# Patient Record
Sex: MALE | Race: WHITE | NOT HISPANIC OR LATINO | Employment: FULL TIME | URBAN - NONMETROPOLITAN AREA
[De-identification: names, ages, dates, MRNs, and addresses within clinical notes are randomized per-mention and may not be internally consistent; named-entity substitution may affect disease eponyms.]

---

## 2018-11-05 ENCOUNTER — APPOINTMENT (OUTPATIENT)
Dept: GENERAL RADIOLOGY | Facility: HOSPITAL | Age: 43
End: 2018-11-05
Attending: EMERGENCY MEDICINE

## 2018-11-05 ENCOUNTER — HOSPITAL ENCOUNTER (EMERGENCY)
Facility: HOSPITAL | Age: 43
Discharge: HOME | End: 2018-11-05
Attending: EMERGENCY MEDICINE | Admitting: EMERGENCY MEDICINE

## 2018-11-05 ENCOUNTER — APPOINTMENT (OUTPATIENT)
Dept: CT IMAGING | Facility: HOSPITAL | Age: 43
End: 2018-11-05
Attending: EMERGENCY MEDICINE

## 2018-11-05 VITALS
OXYGEN SATURATION: 96 % | DIASTOLIC BLOOD PRESSURE: 90 MMHG | TEMPERATURE: 97.7 F | RESPIRATION RATE: 18 BRPM | BODY MASS INDEX: 35.95 KG/M2 | WEIGHT: 265.4 LBS | HEART RATE: 66 BPM | SYSTOLIC BLOOD PRESSURE: 155 MMHG | HEIGHT: 72 IN

## 2018-11-05 DIAGNOSIS — R10.9 ABDOMINAL PAIN, UNSPECIFIED ABDOMINAL LOCATION: Primary | ICD-10-CM

## 2018-11-05 LAB
ALBUMIN SERPL-MCNC: 5 G/DL (ref 3.5–5)
ALBUMIN/GLOB SERPL: 1.7 G/DL (ref 1–2)
ALP SERPL-CCNC: 64 U/L (ref 38–126)
ALT SERPL W P-5'-P-CCNC: 41 U/L (ref 13–69)
ANION GAP SERPL CALCULATED.3IONS-SCNC: 13.4 MMOL/L (ref 10–20)
AST SERPL-CCNC: 25 U/L (ref 15–46)
BASOPHILS # BLD AUTO: 0.06 10*3/MM3 (ref 0–0.2)
BASOPHILS NFR BLD AUTO: 0.6 % (ref 0–2.5)
BILIRUB SERPL-MCNC: 0.5 MG/DL (ref 0.2–1.3)
BUN BLD-MCNC: 15 MG/DL (ref 7–20)
BUN/CREAT SERPL: 15 (ref 6.3–21.9)
CALCIUM SPEC-SCNC: 9.7 MG/DL (ref 8.4–10.2)
CHLORIDE SERPL-SCNC: 104 MMOL/L (ref 98–107)
CO2 SERPL-SCNC: 28 MMOL/L (ref 26–30)
CREAT BLD-MCNC: 1 MG/DL (ref 0.6–1.3)
DEPRECATED RDW RBC AUTO: 38.8 FL (ref 37–54)
EOSINOPHIL # BLD AUTO: 0.01 10*3/MM3 (ref 0–0.7)
EOSINOPHIL NFR BLD AUTO: 0.1 % (ref 0–7)
ERYTHROCYTE [DISTWIDTH] IN BLOOD BY AUTOMATED COUNT: 12.8 % (ref 11.5–14.5)
GFR SERPL CREATININE-BSD FRML MDRD: 82 ML/MIN/1.73
GLOBULIN UR ELPH-MCNC: 3 GM/DL
GLUCOSE BLD-MCNC: 135 MG/DL (ref 74–98)
HCT VFR BLD AUTO: 45.3 % (ref 42–52)
HGB BLD-MCNC: 15.9 G/DL (ref 14–18)
IMM GRANULOCYTES # BLD: 0.05 10*3/MM3 (ref 0–0.06)
IMM GRANULOCYTES NFR BLD: 0.5 % (ref 0–0.6)
LIPASE SERPL-CCNC: 56 U/L (ref 23–300)
LYMPHOCYTES # BLD AUTO: 1.3 10*3/MM3 (ref 0.6–3.4)
LYMPHOCYTES NFR BLD AUTO: 11.9 % (ref 10–50)
MCH RBC QN AUTO: 29.5 PG (ref 27–31)
MCHC RBC AUTO-ENTMCNC: 35.1 G/DL (ref 30–37)
MCV RBC AUTO: 84 FL (ref 80–94)
MONOCYTES # BLD AUTO: 0.28 10*3/MM3 (ref 0–0.9)
MONOCYTES NFR BLD AUTO: 2.6 % (ref 0–12)
NEUTROPHILS # BLD AUTO: 9.19 10*3/MM3 (ref 2–6.9)
NEUTROPHILS NFR BLD AUTO: 84.3 % (ref 37–80)
NRBC BLD MANUAL-RTO: 0 /100 WBC (ref 0–0)
PLATELET # BLD AUTO: 258 10*3/MM3 (ref 130–400)
PMV BLD AUTO: 10.3 FL (ref 6–12)
POTASSIUM BLD-SCNC: 4.4 MMOL/L (ref 3.5–5.1)
PROT SERPL-MCNC: 8 G/DL (ref 6.3–8.2)
RBC # BLD AUTO: 5.39 10*6/MM3 (ref 4.7–6.1)
SODIUM BLD-SCNC: 141 MMOL/L (ref 137–145)
TROPONIN I SERPL-MCNC: <0.012 NG/ML (ref 0–0.03)
TROPONIN I SERPL-MCNC: <0.012 NG/ML (ref 0–0.03)
WBC NRBC COR # BLD: 10.89 10*3/MM3 (ref 4.8–10.8)

## 2018-11-05 PROCEDURE — 96376 TX/PRO/DX INJ SAME DRUG ADON: CPT

## 2018-11-05 PROCEDURE — 71046 X-RAY EXAM CHEST 2 VIEWS: CPT

## 2018-11-05 PROCEDURE — 96374 THER/PROPH/DIAG INJ IV PUSH: CPT

## 2018-11-05 PROCEDURE — 99284 EMERGENCY DEPT VISIT MOD MDM: CPT

## 2018-11-05 PROCEDURE — 80053 COMPREHEN METABOLIC PANEL: CPT | Performed by: EMERGENCY MEDICINE

## 2018-11-05 PROCEDURE — 74177 CT ABD & PELVIS W/CONTRAST: CPT

## 2018-11-05 PROCEDURE — 96361 HYDRATE IV INFUSION ADD-ON: CPT

## 2018-11-05 PROCEDURE — 93005 ELECTROCARDIOGRAM TRACING: CPT | Performed by: EMERGENCY MEDICINE

## 2018-11-05 PROCEDURE — 96375 TX/PRO/DX INJ NEW DRUG ADDON: CPT

## 2018-11-05 PROCEDURE — 25010000002 MORPHINE PER 10 MG: Performed by: EMERGENCY MEDICINE

## 2018-11-05 PROCEDURE — 25010000002 IOPAMIDOL 61 % SOLUTION: Performed by: EMERGENCY MEDICINE

## 2018-11-05 PROCEDURE — 83690 ASSAY OF LIPASE: CPT | Performed by: EMERGENCY MEDICINE

## 2018-11-05 PROCEDURE — 84484 ASSAY OF TROPONIN QUANT: CPT | Performed by: EMERGENCY MEDICINE

## 2018-11-05 PROCEDURE — 85025 COMPLETE CBC W/AUTO DIFF WBC: CPT | Performed by: EMERGENCY MEDICINE

## 2018-11-05 PROCEDURE — 25010000002 ONDANSETRON PER 1 MG: Performed by: EMERGENCY MEDICINE

## 2018-11-05 RX ORDER — SUCRALFATE 1 G/1
1 TABLET ORAL ONCE
Status: COMPLETED | OUTPATIENT
Start: 2018-11-05 | End: 2018-11-05

## 2018-11-05 RX ORDER — FAMOTIDINE 10 MG/ML
20 INJECTION, SOLUTION INTRAVENOUS ONCE
Status: COMPLETED | OUTPATIENT
Start: 2018-11-05 | End: 2018-11-05

## 2018-11-05 RX ORDER — MORPHINE SULFATE 4 MG/ML
4 INJECTION, SOLUTION INTRAMUSCULAR; INTRAVENOUS ONCE
Status: COMPLETED | OUTPATIENT
Start: 2018-11-05 | End: 2018-11-05

## 2018-11-05 RX ORDER — ONDANSETRON 2 MG/ML
4 INJECTION INTRAMUSCULAR; INTRAVENOUS ONCE
Status: COMPLETED | OUTPATIENT
Start: 2018-11-05 | End: 2018-11-05

## 2018-11-05 RX ORDER — ONDANSETRON 4 MG/1
4 TABLET, ORALLY DISINTEGRATING ORAL EVERY 8 HOURS PRN
Qty: 12 TABLET | Refills: 0 | Status: SHIPPED | OUTPATIENT
Start: 2018-11-05 | End: 2018-11-27

## 2018-11-05 RX ORDER — ALUMINA, MAGNESIA, AND SIMETHICONE 2400; 2400; 240 MG/30ML; MG/30ML; MG/30ML
15 SUSPENSION ORAL ONCE
Status: COMPLETED | OUTPATIENT
Start: 2018-11-05 | End: 2018-11-05

## 2018-11-05 RX ORDER — SUCRALFATE 1 G/1
1 TABLET ORAL 4 TIMES DAILY
Qty: 40 TABLET | Refills: 0 | Status: SHIPPED | OUTPATIENT
Start: 2018-11-05 | End: 2018-11-27

## 2018-11-05 RX ORDER — PANTOPRAZOLE SODIUM 20 MG/1
20 TABLET, DELAYED RELEASE ORAL 2 TIMES DAILY
Qty: 60 TABLET | Refills: 0 | Status: SHIPPED | OUTPATIENT
Start: 2018-11-05 | End: 2018-12-19 | Stop reason: ALTCHOICE

## 2018-11-05 RX ADMIN — MORPHINE SULFATE 4 MG: 4 INJECTION, SOLUTION INTRAMUSCULAR; INTRAVENOUS at 08:43

## 2018-11-05 RX ADMIN — SODIUM CHLORIDE 1000 ML: 9 INJECTION, SOLUTION INTRAVENOUS at 08:04

## 2018-11-05 RX ADMIN — LIDOCAINE HYDROCHLORIDE 15 ML: 20 SOLUTION ORAL; TOPICAL at 08:00

## 2018-11-05 RX ADMIN — SUCRALFATE 1 G: 1 TABLET ORAL at 09:43

## 2018-11-05 RX ADMIN — ONDANSETRON 4 MG: 2 INJECTION INTRAMUSCULAR; INTRAVENOUS at 08:02

## 2018-11-05 RX ADMIN — FAMOTIDINE 20 MG: 10 INJECTION, SOLUTION INTRAVENOUS at 08:03

## 2018-11-05 RX ADMIN — MORPHINE SULFATE 4 MG: 4 INJECTION, SOLUTION INTRAMUSCULAR; INTRAVENOUS at 09:43

## 2018-11-05 RX ADMIN — ALUMINUM HYDROXIDE, MAGNESIUM HYDROXIDE, AND DIMETHICONE 15 ML: 400; 400; 40 SUSPENSION ORAL at 08:00

## 2018-11-05 RX ADMIN — IOPAMIDOL 100 ML: 612 INJECTION, SOLUTION INTRAVENOUS at 08:12

## 2018-11-05 NOTE — ED PROVIDER NOTES
TRIAGE CHIEF COMPLAINT:     Nursing and triage notes reviewed    Chief Complaint   Patient presents with   • Abdominal Pain      HPI: Alek Ray is a 43 y.o. male who presents to the emergency department complaining of epigastric abdominal pain associated with nausea and vomiting.  Patient states symptoms began around 3 this morning.  Patient states he has been sweaty and has had several episodes of nonbilious emesis.  Patient states that he has had episodes like this intermittently over the past year and is seen his primary care physician for this.  He states he was on some medications for reflux or an ulcer for a few weeks which she states did help in the short-term but now is getting symptoms again.  He denies diarrhea.  Denies fevers or chills.  Denies shortness of breath.     REVIEW OF SYSTEMS: All other systems reviewed and are negative     PAST MEDICAL HISTORY:   History reviewed. No pertinent past medical history.     FAMILY HISTORY:   History reviewed. No pertinent family history.     SOCIAL HISTORY:   Social History     Social History   • Marital status:      Spouse name: N/A   • Number of children: N/A   • Years of education: N/A     Occupational History   • Not on file.     Social History Main Topics   • Smoking status: Never Smoker   • Smokeless tobacco: Never Used   • Alcohol use Yes      Comment: 2-3x weekly   • Drug use: No   • Sexual activity: Not on file     Other Topics Concern   • Not on file     Social History Narrative   • No narrative on file        SURGICAL HISTORY:   History reviewed. No pertinent surgical history.     CURRENT MEDICATIONS:      Medication List      You have not been prescribed any medications.        ALLERGIES: Patient has no known allergies.     PHYSICAL EXAM:   VITAL SIGNS:   Vitals:    11/05/18 0716   BP: (!) 182/109   Pulse: 84   Resp: 18   Temp: 97.7 °F (36.5 °C)   SpO2: 97%      CONSTITUTIONAL: Awake, oriented, appears non-toxic but  uncomfortable  HENT: Atraumatic, normocephalic, oral mucosa pink and moist, airway patent.  EYES: Conjunctiva clear   NECK: Trachea midline  CARDIOVASCULAR: Normal heart rate, Normal rhythm, No murmurs, rubs, gallops   PULMONARY/CHEST: Clear to auscultation, no rhonchi, wheezes, or rales. Symmetrical breath sounds.  ABDOMINAL: Non-distended, soft, minimal epigastric tenderness - no rebound or guarding.   NEUROLOGIC: Non-focal, moving all four extremities, no gross sensory or motor deficits.   EXTREMITIES: No clubbing, cyanosis, or edema   SKIN: Warm, Dry, No erythema, No rash     ED COURSE / MEDICAL DECISION MAKING:   Alek Ray is a 43 y.o. male who presents to the emergency department for evaluation of epigastric abdominal pain associated with nausea and vomiting.  Patient is hypertensive on arrival in the emergency department appears mildly uncomfortable.  Physical exam reveals some very minimal tenderness in epigastric abdomen.  The exam is otherwise largely unremarkable.  We'll obtain labs and imaging for further evaluation of patient's symptoms.  EKG was obtained on arrival which I interpreted reveals sinus rhythm with rate of 72 bpm.  There are no acute ST segment or T-wave changes.  No acute signs of arrhythmia or ischemia.  Normal-appearing EKG.  Patient's blood work is largely unremarkable.  A chest x-ray does not reveal any acute abnormalities.  CT scan of the abdomen and pelvis does not reveal any acute intra-abdominal process.  Patient had some improvement with symptomatic therapies in the emergency department.  We will discharge with a GI outpatient referral for further evaluation.    DECISION TO DISCHARGE/ADMIT: see ED care timeline     FINAL IMPRESSION:   1 -- abdominal pain   2 --   3 --     Electronically signed by: Odalys Lorenz MD, 11/5/2018 7:41 AM       Odalys Lorenz MD  11/05/18 1044

## 2018-11-27 ENCOUNTER — PREP FOR SURGERY (OUTPATIENT)
Dept: OTHER | Facility: HOSPITAL | Age: 43
End: 2018-11-27

## 2018-11-27 ENCOUNTER — OFFICE VISIT (OUTPATIENT)
Dept: GASTROENTEROLOGY | Facility: CLINIC | Age: 43
End: 2018-11-27

## 2018-11-27 VITALS
WEIGHT: 261 LBS | HEIGHT: 72 IN | SYSTOLIC BLOOD PRESSURE: 126 MMHG | TEMPERATURE: 97.6 F | BODY MASS INDEX: 35.35 KG/M2 | DIASTOLIC BLOOD PRESSURE: 91 MMHG | HEART RATE: 93 BPM | RESPIRATION RATE: 15 BRPM

## 2018-11-27 DIAGNOSIS — R10.13 EPIGASTRIC PAIN: Primary | ICD-10-CM

## 2018-11-27 DIAGNOSIS — R11.2 NAUSEA AND VOMITING, INTRACTABILITY OF VOMITING NOT SPECIFIED, UNSPECIFIED VOMITING TYPE: Chronic | ICD-10-CM

## 2018-11-27 DIAGNOSIS — R10.13 EPIGASTRIC PAIN: Primary | Chronic | ICD-10-CM

## 2018-11-27 DIAGNOSIS — R19.7 DIARRHEA, UNSPECIFIED TYPE: Chronic | ICD-10-CM

## 2018-11-27 DIAGNOSIS — R11.2 NAUSEA AND VOMITING, INTRACTABILITY OF VOMITING NOT SPECIFIED, UNSPECIFIED VOMITING TYPE: ICD-10-CM

## 2018-11-27 PROCEDURE — 99204 OFFICE O/P NEW MOD 45 MIN: CPT | Performed by: NURSE PRACTITIONER

## 2018-11-27 RX ORDER — SODIUM CHLORIDE 9 MG/ML
70 INJECTION, SOLUTION INTRAVENOUS CONTINUOUS PRN
Status: CANCELLED | OUTPATIENT
Start: 2018-11-27

## 2018-11-27 NOTE — PATIENT INSTRUCTIONS
1. Antireflux measures: Avoid fried, fatty foods, alcohol, chocolate, coffee, tea,  soft drinks, peppermint and spearmint, spicy foods, tomatoes and tomato based foods, onion based foods, and smoking. Other antireflux measures include weight reduction if overweight, avoiding tight clothing around the abdomen, elevating the head of the bed 6 inches with blocks under the head board, and don't drink or eat before going to bed and avoid lying down immediately after meals.  2. Omeprazole 20 mg 1 po daily in the am and 1 po in the pm.  3. Zofran 4 mg 1 po every 8 hours as needed for nausea.  4. Upper endoscopy-EGD: Description of the procedure, risks, benefits, alternatives and options, including nonoperative options, were discussed with the patient in detail. The patient understands and wishes to proceed.

## 2018-11-27 NOTE — PROGRESS NOTES
"Chief Complaint   Patient presents with   • Abdominal Pain   • Vomiting   • Nausea   • Diarrhea     There is a long-standing history of epigastric pain that started about 18 months ago. The pain has gradually worsened. The pain occurs \"randomly\". The pain is severe and described as sharp. Eating can make the pain worse. The patient has been taking Pantoprazole, but it does not help the pain. The patient denies taking NSAIDs. The patient denies heartburn. There is no history of difficulty swallowing.    There is a long-standing history of nausea. The nausea occurs when he is having the epigastric pain. Eating makes the nausea worse. He has taken Zofran as needed but he has not noticed any improvement with nausea. He has had episodes of vomiting. He has vomited dark green bile at times, but no bright red blood. There is no history of melena.     There is a long-standing history of diarrhea. The patient may have diarrhea once per month on average with 2-3 episodes of diarrhea per day. Stools are described as loose. He is not taking anything for diarrhea. There is no history of bright red blood per rectum. The patient denies constipation.    The patient's last colonoscopy was over 10 years ago. There is a family history of colon cancer in the patient's grandfather in his 80's.    Abdominal Pain   This is a chronic problem. Episode onset: 18 months ago. The onset quality is sudden. The problem occurs intermittently. The problem has been gradually worsening. The pain is located in the epigastric region. The pain is severe. The quality of the pain is sharp. The abdominal pain does not radiate. Associated symptoms include diarrhea, nausea and vomiting. Pertinent negatives include no arthralgias, constipation, dysuria, fever, headaches, hematuria or myalgias. The pain is aggravated by eating. The pain is relieved by nothing. He has tried proton pump inhibitors for the symptoms. The treatment provided no relief. Prior " diagnostic workup includes CT scan.   Nausea   This is a chronic problem. Episode onset: 18 months ago. The problem occurs intermittently. The problem has been unchanged. Associated symptoms include abdominal pain, nausea and vomiting. Pertinent negatives include no arthralgias, chest pain, chills, coughing, fatigue, fever, headaches, joint swelling, myalgias or rash. The symptoms are aggravated by eating. He has tried nothing for the symptoms.   Vomiting    This is a chronic problem. Episode onset: 18 months ago. Episode frequency: intermittently. The problem has been unchanged. The emesis has an appearance of bile. There has been no fever. Associated symptoms include abdominal pain and diarrhea. Pertinent negatives include no arthralgias, chest pain, chills, coughing, dizziness, fever, headaches or myalgias. He has tried nothing for the symptoms.   Diarrhea    This is a chronic problem. Episode onset: over 10 years. Episode frequency: once per month with 2-3 episodes per day. The problem has been unchanged. Diarrhea characteristics: loose. The patient states that diarrhea does not awaken him from sleep. Associated symptoms include abdominal pain and vomiting. Pertinent negatives include no arthralgias, chills, coughing, fever, headaches or myalgias. Nothing aggravates the symptoms. There are no known risk factors. He has tried nothing for the symptoms.     Review of Systems   Constitutional: Negative for appetite change, chills, fatigue, fever and unexpected weight change.   HENT: Negative for mouth sores, nosebleeds and trouble swallowing.    Eyes: Negative for discharge and redness.   Respiratory: Negative for apnea, cough and shortness of breath.    Cardiovascular: Negative for chest pain, palpitations and leg swelling.   Gastrointestinal: Positive for abdominal pain, diarrhea, nausea and vomiting. Negative for abdominal distention, anal bleeding, blood in stool and constipation.   Endocrine: Negative for cold  "intolerance, heat intolerance and polydipsia.   Genitourinary: Negative for dysuria, hematuria and urgency.   Musculoskeletal: Negative for arthralgias, joint swelling and myalgias.   Skin: Negative for rash.   Allergic/Immunologic: Negative for food allergies and immunocompromised state.   Neurological: Negative for dizziness, seizures, syncope and headaches.   Hematological: Negative for adenopathy. Does not bruise/bleed easily.   Psychiatric/Behavioral: Negative for dysphoric mood. The patient is not nervous/anxious and is not hyperactive.      Patient Active Problem List   Diagnosis   • Epigastric pain   • Nausea and vomiting     History reviewed. No pertinent past medical history.     Past Surgical History:   Procedure Laterality Date   • COLONOSCOPY  over 10 years ago      Family History   Problem Relation Age of Onset   • Colon cancer Maternal Grandfather 80     Social History     Tobacco Use   • Smoking status: Never Smoker   • Smokeless tobacco: Never Used   Substance Use Topics   • Alcohol use: Yes     Comment: 2-3x weekly       Current Outpatient Medications:   •  pantoprazole (PROTONIX) 20 MG EC tablet, Take 1 tablet by mouth 2 (Two) Times a Day., Disp: 60 tablet, Rfl: 0    No Known Allergies    /91   Pulse 93   Temp 97.6 °F (36.4 °C)   Resp 15   Ht 182.9 cm (72\")   Wt 118 kg (261 lb)   BMI 35.40 kg/m²     Physical Exam   Constitutional: He is oriented to person, place, and time. He appears well-developed and well-nourished. No distress.   HENT:   Head: Normocephalic and atraumatic.   Right Ear: Hearing and external ear normal.   Left Ear: Hearing and external ear normal.   Nose: Nose normal.   Mouth/Throat: Oropharynx is clear and moist and mucous membranes are normal. Mucous membranes are not pale, not dry and not cyanotic. No oral lesions. No oropharyngeal exudate.   Eyes: Conjunctivae and EOM are normal. Right eye exhibits no discharge. Left eye exhibits no discharge.   Neck: Trachea " normal. Neck supple. No JVD present. No edema present. No thyroid mass and no thyromegaly present.   Cardiovascular: Normal rate, regular rhythm, S2 normal and normal heart sounds. Exam reveals no gallop, no S3 and no friction rub.   No murmur heard.  Pulmonary/Chest: Effort normal and breath sounds normal. No respiratory distress. He exhibits no tenderness.   Abdominal: Normal appearance and bowel sounds are normal. He exhibits no distension, no ascites and no mass. There is no splenomegaly or hepatomegaly. There is no tenderness. There is no rigidity, no rebound and no guarding. No hernia.     Vascular Status -  His right foot exhibits no edema. His left foot exhibits no edema.  Lymphadenopathy:     He has no cervical adenopathy.        Left: No supraclavicular adenopathy present.   Neurological: He is alert and oriented to person, place, and time. He has normal strength. No cranial nerve deficit or sensory deficit.   Skin: No rash noted. He is not diaphoretic. No cyanosis. No pallor. Nails show no clubbing.   Psychiatric: He has a normal mood and affect.   Nursing note and vitals reviewed.  Stigmata of chronic liver disease:  None.  Asterixis:  None.    Laboratory Tests:   Upon review of records:     Dated 11/5/2018 glucose 135 BUN 15 creatinine 1.0 sodium 141 potassium 4.4 chloride 104 CO2 28 calcium 9.7 albumin 5.0 ALT 41 AST 25 alkaline phosphatase 64 total bilirubin 0.5 WBC 10.89 hemoglobin 15.9 hematocrit 45.3 platelet count 258 MCV 84.0 lipase 56 troponin <0.012    Abdominal Imaging:  Upon review of records:     CT of abdomen and pelvis with contrast dated 11/5/2018 reveals lung bases are clear. The heart is normal in size. The liver is normal. The spleen is unremarkable. No adrenal mass is present.  The pancreas is normal. The kidneys are normal. The aorta is normal in caliber. There is no free fluid or adenopathy. The abdominal portions of the GI tract are unremarkable with no evidence of obstruction. The  appendix is normal.  The urinary bladder is unremarkable. There is no significant free fluid or adenopathy.       Assessment:      ICD-10-CM ICD-9-CM   1. Epigastric pain R10.13 789.06   2. Nausea and vomiting, intractability of vomiting not specified, unspecified vomiting type R11.2 787.01   3. Diarrhea, unspecified type R19.7 787.91     Plan/  Patient Instructions   1. Antireflux measures: Avoid fried, fatty foods, alcohol, chocolate, coffee, tea,  soft drinks, peppermint and spearmint, spicy foods, tomatoes and tomato based foods, onion based foods, and smoking. Other antireflux measures include weight reduction if overweight, avoiding tight clothing around the abdomen, elevating the head of the bed 6 inches with blocks under the head board, and don't drink or eat before going to bed and avoid lying down immediately after meals.  2. Omeprazole 20 mg 1 po daily in the am and 1 po in the pm.  3. Zofran 4 mg 1 po every 8 hours as needed for nausea.  4. Upper endoscopy-EGD: Description of the procedure, risks, benefits, alternatives and options, including nonoperative options, were discussed with the patient in detail. The patient understands and wishes to proceed.     FLAVIA Christianson

## 2018-12-12 ENCOUNTER — HOSPITAL ENCOUNTER (OUTPATIENT)
Facility: HOSPITAL | Age: 43
Setting detail: HOSPITAL OUTPATIENT SURGERY
Discharge: HOME | End: 2018-12-12
Attending: INTERNAL MEDICINE | Admitting: INTERNAL MEDICINE

## 2018-12-12 ENCOUNTER — ANESTHESIA EVENT (OUTPATIENT)
Dept: GASTROENTEROLOGY | Facility: HOSPITAL | Age: 43
End: 2018-12-12

## 2018-12-12 ENCOUNTER — ANESTHESIA (OUTPATIENT)
Dept: GASTROENTEROLOGY | Facility: HOSPITAL | Age: 43
End: 2018-12-12

## 2018-12-12 ENCOUNTER — TELEPHONE (OUTPATIENT)
Dept: GASTROENTEROLOGY | Facility: CLINIC | Age: 43
End: 2018-12-12

## 2018-12-12 VITALS
HEIGHT: 72 IN | TEMPERATURE: 97.6 F | OXYGEN SATURATION: 92 % | SYSTOLIC BLOOD PRESSURE: 132 MMHG | HEART RATE: 77 BPM | DIASTOLIC BLOOD PRESSURE: 84 MMHG | WEIGHT: 261 LBS | RESPIRATION RATE: 20 BRPM | BODY MASS INDEX: 35.35 KG/M2

## 2018-12-12 DIAGNOSIS — R10.13 EPIGASTRIC PAIN: ICD-10-CM

## 2018-12-12 DIAGNOSIS — R11.2 NAUSEA AND VOMITING, INTRACTABILITY OF VOMITING NOT SPECIFIED, UNSPECIFIED VOMITING TYPE: ICD-10-CM

## 2018-12-12 PROCEDURE — 43239 EGD BIOPSY SINGLE/MULTIPLE: CPT | Performed by: INTERNAL MEDICINE

## 2018-12-12 PROCEDURE — 25010000002 PROPOFOL 200 MG/20ML EMULSION: Performed by: NURSE ANESTHETIST, CERTIFIED REGISTERED

## 2018-12-12 PROCEDURE — 25010000002 ONDANSETRON PER 1 MG: Performed by: NURSE ANESTHETIST, CERTIFIED REGISTERED

## 2018-12-12 RX ORDER — SODIUM CHLORIDE 9 MG/ML
70 INJECTION, SOLUTION INTRAVENOUS CONTINUOUS PRN
Status: DISCONTINUED | OUTPATIENT
Start: 2018-12-12 | End: 2018-12-12 | Stop reason: HOSPADM

## 2018-12-12 RX ORDER — PROPOFOL 10 MG/ML
INJECTION, EMULSION INTRAVENOUS AS NEEDED
Status: DISCONTINUED | OUTPATIENT
Start: 2018-12-12 | End: 2018-12-12 | Stop reason: SURG

## 2018-12-12 RX ORDER — ONDANSETRON 2 MG/ML
INJECTION INTRAMUSCULAR; INTRAVENOUS AS NEEDED
Status: DISCONTINUED | OUTPATIENT
Start: 2018-12-12 | End: 2018-12-12 | Stop reason: SURG

## 2018-12-12 RX ORDER — SODIUM CHLORIDE 0.9 % (FLUSH) 0.9 %
3 SYRINGE (ML) INJECTION AS NEEDED
Status: DISCONTINUED | OUTPATIENT
Start: 2018-12-12 | End: 2018-12-12 | Stop reason: HOSPADM

## 2018-12-12 RX ADMIN — LIDOCAINE HYDROCHLORIDE 100 MG: 20 INJECTION, SOLUTION INTRAVENOUS at 07:17

## 2018-12-12 RX ADMIN — PROPOFOL 100 MG: 10 INJECTION, EMULSION INTRAVENOUS at 07:17

## 2018-12-12 RX ADMIN — PROPOFOL 100 MG: 10 INJECTION, EMULSION INTRAVENOUS at 07:24

## 2018-12-12 RX ADMIN — ONDANSETRON 4 MG: 2 INJECTION INTRAMUSCULAR; INTRAVENOUS at 07:24

## 2018-12-12 RX ADMIN — PROPOFOL 50 MG: 10 INJECTION, EMULSION INTRAVENOUS at 07:28

## 2018-12-12 RX ADMIN — SODIUM CHLORIDE 70 ML/HR: 9 INJECTION, SOLUTION INTRAVENOUS at 07:03

## 2018-12-12 NOTE — OP NOTE
PROCEDURE:  Upper Endoscopy with biopsies.    DATE OF PROCEDURE: December 12, 2018.    REFERRING PROVIDER:  Enrique Loving MD.     INSTRUMENT: Olympus GIF H 190 video endoscope     INDICATIONS OF THE PROCEDURE: This is a 43-year-old white male with history of recurrent nausea vomiting and epigastric abdominal pain. Currently undergoing upper endoscopy for further evaluation.       BIOPSIES: Second portion of duodenum.  Gastric antrum, body and fundus.  Biopsies were obtained from the distal esophagus-short segment Alvarez's appearing mucosa.     MEDICATIONS:  MAC.     PHOTOGRAPHS:  Photographs were included in the medical records.     CONSENT/PREPROCEDURE EVALUATION:  Risks, benefits, alternatives and options of the procedure including risks of anesthesia/sedation were discussed and informed consent was obtained prior to the procedure. History and physical examination were performed and nothing precluded the test.     REPORT:  The patient was placed in left lateral decubitus position. Once under the influence of IV sedation, the instrument was inserted into the mouth and esophagus was intubated under direct vision without difficulty.     Esophagus:  Z line was noted to be around 41  cm.  mild erythematous distal esophagitis was seen.  A small sliding hiatal hernia less than 3 cm was noted.  1 cm tongue of gastric type mucosa was noted in the distal esophagus raising the possibility of underlying short segment Alvarez's. This was biopsied.      Stomach:  Antrum:  Erythematous gastritis.  Evidence of healed ulceration was noted. Angulus, lesser and greater curves: Normal.  Retroflex examination: Sliding hiatal hernia.  Cardia and fundus:  Normal.     Body of the stomach: Erythematous gastritis.  Good distensibility of the stomach was achieved no giant folds were noted.   Biopsies were obtained from the gastric antrum,  body and fundus.    Pylorus and pyloric channel:  normal.     Duodenum:  Bulb: Normal.  Second  portion: normal.  No scalloping was seen in the second portion of duodenum.  Biopsies were obtained from the second portion of duodenum. Major ampulla was in part visualized and appeared to be somewhat prominent.    Intervention:  None.       The upper GI tract was decompressed and the scope was pulled out of the patient. The patient tolerated the procedure well.     DIAGNOSES:     1. Erythematous distal esophagitis.  2. Small sliding hiatal hernia less than 3 cm.  3. Erythematous gastritis with an occasional erosion at the gastric antrum.  4. Evidence of old healed ulceration.  5. Major ampulla was in part visualized. Somewhat prominent but still within normal limits.  6. Good distensibility of the stomach was achieved. No giant folds were noted.    RECOMMENDATIONS:  1.  Dietary instructions.  2.  Pantoprazole 40 mg 1 p.o. q.a.m. 1/2 hour before breakfast.  3.  Follow biopsies.  4.  Follow up in office.  5.  Zofran 4 mg tablet.  1-to 3 times a day by mouth as needed for nausea.         Thank you very much for letting me participate in the care of this patient. Please do not hesitate to call me if you have any questions.

## 2018-12-12 NOTE — ANESTHESIA PREPROCEDURE EVALUATION
Anesthesia Evaluation     Patient summary reviewed and Nursing notes reviewed   NPO Solid Status: > 8 hours  NPO Liquid Status: > 8 hours           Airway   Mallampati: I  TM distance: >3 FB  Neck ROM: full  No difficulty expected  Dental - normal exam     Pulmonary - normal exam   (+) sleep apnea ( snoring),   Cardiovascular - negative cardio ROS and normal exam  Exercise tolerance: excellent (>7 METS)        Neuro/Psych- negative ROS  GI/Hepatic/Renal/Endo - negative ROS     Musculoskeletal (-) negative ROS    Abdominal  - normal exam    Bowel sounds: normal.   Substance History - negative use     OB/GYN negative ob/gyn ROS         Other                        Anesthesia Plan    ASA 1     MAC     intravenous induction   Anesthetic plan, all risks, benefits, and alternatives have been provided, discussed and informed consent has been obtained with: patient.    Plan discussed with attending.

## 2018-12-12 NOTE — DISCHARGE INSTRUCTIONS
No pushing, pulling, tugging, heavy lifting, or strenuous activity.  No major decision making, driving, or drinking alcoholic beverages for 24 hours. (due to the medications you have received)  Always use good hand hygiene/washing techniques.  NO driving while taking pain medications.    To assist you in voiding:  Drink plenty of fluids  Listen to running water while attempting to void.    If you are unable to urinate and you have an uncomfortable urge to void or it has been   6 hours since you were discharged, return to the Emergency Room    ************************************************************************************      Postprocedure instructions.  1. Nothing by mouth for 30 min.  2. Clear liquids diet (No Sodas) for 1 hours.  3. May advance to soft low-fat diet  in 2 hours       Diet otherwise:    1. Low fat diet.    2. Avoid soda beverages, excessive use of coffee and tea.   3. Avoid smoking and alcohol.      Other Instructions:  Call [facility] at [phone] or come to the Emergency Department if you experience the following: Chest pain, abdominal pain, bleeding (vomiting of blood or coffee colored material, black stools or gigi blood in stools),   fever/chills, nausea and vomiting or dizziness.    Follow-up:    Dr. Hill in 3-4 weeks.   Office phone #[phone].   If you already have an appointment just keep that appointment.    ************************************************************************************    Notes to the patient and the family from Dr. Hill.    Dear patient/family member,    Findings on today's procedure are as follows:  1. Esophagitis. Inflammation of the esophagus.  2. Inflammation of the stomach. Gastritis.    3. Small sliding hiatal hernia.    4. No cancer. No active ulcers.    Recommendations:    1. Protonix (Pantoprazole) tablet 40 mg tablet. Take 1 tablet orally in the morning half an hour before eating every day.  2. Zofran 4 mg tablet.  1-to 3  times a day by mouth as needed for nausea.       Should you have more questions please do not hesitate to talk to the nurse who can call me and let me talk to you.      I hope you feel better.    Guido Hill M.D., FACP, FACG.

## 2018-12-12 NOTE — TELEPHONE ENCOUNTER
----- Message from Rena Vázquez sent at 12/12/2018  1:49 PM EST -----  PT WIFE CALLED AND LM WITH SEVERAL CLINICAL QUESTIONS, WOULD LIKE A CALL BACK

## 2018-12-12 NOTE — ANESTHESIA POSTPROCEDURE EVALUATION
Patient: Alek Ray    Procedure Summary     Date:  12/12/18 Room / Location:  [facility] ENDOSCOPY 2 / [facility] ENDOSCOPY    Anesthesia Start:  0710 Anesthesia Stop:  0731    Procedure:  ESOPHAGOGASTRODUODENOSCOPY WITH BIOPSIES (N/A Esophagus) Diagnosis:       Epigastric pain      Nausea and vomiting, intractability of vomiting not specified, unspecified vomiting type      (Epigastric pain [R10.13])      (Nausea and vomiting, intractability of vomiting not specified, unspecified vomiting type [R11.2])    Surgeon:  Guido Hill MD Provider:  Juan Miguel Durand CRNA    Anesthesia Type:  MAC ASA Status:  1          Anesthesia Type: MAC  Last vitals  BP   112/72 (12/12/18 0740)   Temp   97.6 °F (36.4 °C) (12/12/18 0740)   Pulse   91 (12/12/18 0740)   Resp   18 (12/12/18 0740)     SpO2   94 % (12/12/18 0740)     Post Anesthesia Care and Evaluation    Patient location during evaluation: PHASE II  Patient participation: complete - patient participated  Level of consciousness: awake and alert  Pain score: 0  Pain management: satisfactory to patient  Airway patency: patent  Anesthetic complications: No anesthetic complications  PONV Status: none  Cardiovascular status: acceptable and hemodynamically stable  Respiratory status: acceptable  Hydration status: acceptable

## 2018-12-13 ENCOUNTER — TELEPHONE (OUTPATIENT)
Dept: GASTROENTEROLOGY | Facility: CLINIC | Age: 43
End: 2018-12-13

## 2018-12-13 NOTE — TELEPHONE ENCOUNTER
Called wifes cell number as she had called this am.  Message left that biopsy results would be back in 5-7 days and if Dr. Hill thought that celiac disease testing was indicated, then the biopsy would have included that.

## 2018-12-18 LAB
LAB AP CASE REPORT: NORMAL
PATH REPORT.FINAL DX SPEC: NORMAL

## 2018-12-19 ENCOUNTER — TELEPHONE (OUTPATIENT)
Dept: GASTROENTEROLOGY | Facility: CLINIC | Age: 43
End: 2018-12-19

## 2018-12-19 DIAGNOSIS — R12 HEARTBURN: Primary | ICD-10-CM

## 2018-12-19 RX ORDER — PANTOPRAZOLE SODIUM 40 MG/1
TABLET, DELAYED RELEASE ORAL
Qty: 30 TABLET | Refills: 5 | Status: SHIPPED | OUTPATIENT
Start: 2018-12-19 | End: 2019-08-22 | Stop reason: SDUPTHER

## 2018-12-26 ENCOUNTER — TELEPHONE (OUTPATIENT)
Dept: GASTROENTEROLOGY | Facility: CLINIC | Age: 43
End: 2018-12-26

## 2018-12-26 NOTE — TELEPHONE ENCOUNTER
PT CALLING FOR EGD PATH RESULTS, TOLD HIM INFLAMMATION TAKE PROTONIX, BUT WILL HAVE BERNABE CALL WITH REST. HE SAYS TO LEAVE MESSAGE ON HIS PHONE OR WITH WIFE

## 2018-12-28 NOTE — TELEPHONE ENCOUNTER
Left message on voicemail with results. Advised to call back if further questions. He has appointment for follow up to discuss further.

## 2019-01-23 ENCOUNTER — OFFICE VISIT (OUTPATIENT)
Dept: GASTROENTEROLOGY | Facility: CLINIC | Age: 44
End: 2019-01-23

## 2019-01-23 VITALS
TEMPERATURE: 97.7 F | HEIGHT: 72 IN | WEIGHT: 270 LBS | RESPIRATION RATE: 18 BRPM | SYSTOLIC BLOOD PRESSURE: 144 MMHG | DIASTOLIC BLOOD PRESSURE: 91 MMHG | BODY MASS INDEX: 36.57 KG/M2 | HEART RATE: 94 BPM

## 2019-01-23 DIAGNOSIS — R11.2 NAUSEA AND VOMITING, INTRACTABILITY OF VOMITING NOT SPECIFIED, UNSPECIFIED VOMITING TYPE: ICD-10-CM

## 2019-01-23 DIAGNOSIS — R10.13 EPIGASTRIC ABDOMINAL PAIN: Primary | ICD-10-CM

## 2019-01-23 PROCEDURE — 99215 OFFICE O/P EST HI 40 MIN: CPT | Performed by: INTERNAL MEDICINE

## 2019-01-23 NOTE — PROGRESS NOTES
Chief Complaint   Patient presents with   • Abdominal Pain     History of Present Illness     The patient has history of recurrent epigastric abdominal pain for the last 2 years. Pain is described as sharp and moderate to severe in intensity. The pain occurs intermittently perhaps once in 2-3 months. The pain may last for 24-36 hours and occasionally up to couple of days. There is history of associated nausea and at times emesis. The patient denies definite aggravating or relieving factors of abdominal pain. The patient has some worsening of symptoms in November 2018. He had one episode over the last 1 month. The patient breaks into sweat during an attack. The patient has taken Zofran without significant improvement of the nausea and vomiting. There is no history of abdominal distention.    Currently, the patient has been having one formed stool per day. There is history of some loose diarrheal stools in the past. The patient denies recent weight loss.There is no history of overt GI bleed (Hematemesis, melena or hematochezia). The patient denies anemia. He denies reflux. There is no dysphagia or odynophagia. There is no history of liver or pancreatic disease. The patient's grandfather had colon cancer. Otherwise there is no family history of inflammatory bowel disease or chronic liver disease.     Review of Systems   Constitutional: Negative for appetite change, chills, fatigue, fever and unexpected weight change.   HENT: Negative for mouth sores, nosebleeds and trouble swallowing.    Eyes: Negative for discharge and redness.   Respiratory: Negative for apnea, cough and shortness of breath.    Cardiovascular: Negative for chest pain, palpitations and leg swelling.   Gastrointestinal: Positive for abdominal pain, nausea and vomiting. Negative for abdominal distention, anal bleeding, blood in stool, constipation and diarrhea.   Endocrine: Negative for cold intolerance, heat intolerance and polydipsia.   Genitourinary:  "Negative for dysuria, hematuria and urgency.   Musculoskeletal: Negative for arthralgias, joint swelling and myalgias.   Skin: Negative for rash.   Allergic/Immunologic: Negative for food allergies and immunocompromised state.   Neurological: Negative for dizziness, seizures, syncope and headaches.   Hematological: Negative for adenopathy. Does not bruise/bleed easily.   Psychiatric/Behavioral: Negative for dysphoric mood. The patient is not nervous/anxious and is not hyperactive.      Patient Active Problem List   Diagnosis   • Epigastric pain   • Nausea and vomiting     Past Medical History:   Diagnosis Date   • Epigastric pain    • Snores      Past Surgical History:   Procedure Laterality Date   • COLONOSCOPY  over 10 years ago   • ENDOSCOPY N/A 12/12/2018    Procedure: ESOPHAGOGASTRODUODENOSCOPY WITH BIOPSIES;  Surgeon: Guido Hill MD;  Location: [facility] ENDOSCOPY;  Service: Gastroenterology   • WISDOM TOOTH EXTRACTION       Family History   Problem Relation Age of Onset   • Colon cancer Maternal Grandfather 80     Social History     Tobacco Use   • Smoking status: Never Smoker   • Smokeless tobacco: Never Used   Substance Use Topics   • Alcohol use: Yes     Comment: OCCASSIONALLY SOCIAL       Current Outpatient Medications:   •  pantoprazole (PROTONIX) 40 MG EC tablet, Take 1 tablet 30 minutes before breakfast daily., Disp: 30 tablet, Rfl: 5    No Known Allergies    Blood pressure 144/91, pulse 94, temperature 97.7 °F (36.5 °C), resp. rate 18, height 182.9 cm (72\"), weight 122 kg (270 lb).    Physical Exam   Constitutional: He is oriented to person, place, and time. He appears well-developed and well-nourished. No distress.   HENT:   Head: Normocephalic and atraumatic.   Right Ear: Hearing and external ear normal.   Left Ear: Hearing and external ear normal.   Nose: Nose normal.   Mouth/Throat: Oropharynx is clear and moist and mucous membranes are normal. Mucous membranes are not pale, not dry and not " cyanotic. No oral lesions. No oropharyngeal exudate.   Eyes: Conjunctivae and EOM are normal. Right eye exhibits no discharge. Left eye exhibits no discharge. No scleral icterus.   Neck: Trachea normal. Neck supple. No JVD present. No edema present. No thyroid mass and no thyromegaly present.   Cardiovascular: Normal rate, regular rhythm, S2 normal and normal heart sounds. Exam reveals no gallop, no S3 and no friction rub.   No murmur heard.  Pulmonary/Chest: Effort normal and breath sounds normal. No respiratory distress. He has no wheezes. He has no rales. He exhibits no tenderness.   Abdominal: Soft. Normal appearance and bowel sounds are normal. He exhibits no distension, no ascites and no mass. There is no splenomegaly or hepatomegaly. There is no tenderness. There is no rigidity, no rebound and no guarding. No hernia.   Musculoskeletal: He exhibits no tenderness or deformity.     Vascular Status -  His right foot exhibits no edema. His left foot exhibits no edema.  Lymphadenopathy:     He has no cervical adenopathy.        Left: No supraclavicular adenopathy present.   Neurological: He is alert and oriented to person, place, and time. He has normal strength. No cranial nerve deficit or sensory deficit. He exhibits normal muscle tone. Coordination normal.   Skin: No rash noted. He is not diaphoretic. No cyanosis. No pallor. Nails show no clubbing.   Psychiatric: He has a normal mood and affect. His behavior is normal. Judgment and thought content normal.   Nursing note and vitals reviewed.  Stigmata of chronic liver disease:  None.  Asterixis:  None.    Laboratory Testing:  Upon review of medical records:    Dated 11/5/2018 glucose 135 BUN 15 creatinine 1.0 sodium 141 potassium 4.4 chloride 104 CO2 28 calcium 9.7 albumin 5.0 ALT 41 AST 25 alkaline phosphatase 64 total bilirubin 0.5 WBC 10.89 hemoglobin 15.9 hematocrit 45.3 platelet count 258 MCV 84.0 lipase 56 troponin <0.012     Abdominal Imaging:  Upon review  of records:     CT of abdomen and pelvis with contrast dated 11/5/2018 reveals lung bases are clear. The heart is normal in size. The liver is normal. The spleen is unremarkable. No adrenal mass is present.  The pancreas is normal. The kidneys are normal. The aorta is normal in caliber. There is no free fluid or adenopathy. The abdominal portions of the GI tract are unremarkable with no evidence of obstruction. The appendix is normal.  The urinary bladder is unremarkable. There is no significant free fluid or adenopathy.        Procedures:  Upon review of medical records:    Dated December 12, 2018 the patient underwent an upper endoscopy which revealed: Erythematous distal esophagitis.  Small sliding hiatal hernia less than 3 cm.  Erythematous gastritis with an occasional erosion at the gastric antrum.  Evidence of old healed ulceration.  Major ampulla was in part visualized.  Somewhat prominent but still within normal limits.  Good distensibility of the stomach was achieved.  No giant folds were noted.  Second portion of duodenum, biopsy revealed small intestinal mucosa with no significant histopathologic abnormalities.  Antrum, body and fundus, biopsies revealed gastric antral and fundic-type mucosa with reactive (chemical) gastropathy.  Negative for intestinal metaplasia or dysplasia.  No Helicobacter pylori-like organisms seen.  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  Squamous mucosa with features suggestive of reflux esophagitis.  Negative for specific microorganisms.    Assessment:      ICD-10-CM ICD-9-CM   1. Epigastric abdominal pain R10.13 789.06   2. Nausea and vomiting, intractability of vomiting not specified, unspecified vomiting type R11.2 787.01         Discussion:  1. 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  2. Differential diagnosis includes pancreatobiliary disease. His lipase was normal during an episode.  Possibility of partial intermittent small bowel obstruction is also a consideration.    Plan/  Patient Instructions   1. Low-fat diet.  2. Weight reduction.  3. RUQ US: Gallbladder, CBD size, pancreas, and liver.  4. Time spent 40 minutes. 30 minutes were spent face-to-face discussion. Discussed with the patient and his wife in detail. Multiple questions were answered. Opportunity was given for additional questions.  5. Follow-up in 2-3 weeks.       Guido Hill MD

## 2019-01-27 NOTE — PATIENT INSTRUCTIONS
1. Low-fat diet.  2. Weight reduction.  3. RUQ US: Gallbladder, CBD size, pancreas, and liver.  4. Time spent 40 minutes. 30 minutes were spent face-to-face discussion. Discussed with the patient and his wife in detail. Multiple questions were answered. Opportunity was given for additional questions.  5. Follow-up in 2-3 weeks.

## 2019-02-04 ENCOUNTER — HOSPITAL ENCOUNTER (OUTPATIENT)
Dept: ULTRASOUND IMAGING | Facility: HOSPITAL | Age: 44
Discharge: HOME | End: 2019-02-04
Attending: INTERNAL MEDICINE | Admitting: INTERNAL MEDICINE

## 2019-02-04 ENCOUNTER — TELEPHONE (OUTPATIENT)
Dept: GASTROENTEROLOGY | Facility: CLINIC | Age: 44
End: 2019-02-04

## 2019-02-04 DIAGNOSIS — R10.13 EPIGASTRIC ABDOMINAL PAIN: ICD-10-CM

## 2019-02-04 DIAGNOSIS — R11.2 NAUSEA AND VOMITING, INTRACTABILITY OF VOMITING NOT SPECIFIED, UNSPECIFIED VOMITING TYPE: ICD-10-CM

## 2019-02-04 PROCEDURE — 76705 ECHO EXAM OF ABDOMEN: CPT

## 2019-02-04 NOTE — TELEPHONE ENCOUNTER
Let him know his ultrasound looked ok. He needs to avoid caffeine beverages, tomato based foods, onion based foods, chocolate, fried/greasy foods. If he had rich foods last night, this could be the cause of his symptoms. Make sure to take the Pantoprazole first in the morning by itself and wait 30 minutes before eating or taking other medications.  If he is having chest pain, he needs to go to the emergency room for evaluation or contact his PCP for further instructions. Keep follow up appointment next week with Dr. Hill.

## 2019-02-04 NOTE — TELEPHONE ENCOUNTER
Pt called for US results, and is having abd pain,close to breast bone area, first attack since seeing Dr Hill, did eat rich food yesterday, is wondering what to do next

## 2019-02-14 ENCOUNTER — OFFICE VISIT (OUTPATIENT)
Dept: GASTROENTEROLOGY | Facility: CLINIC | Age: 44
End: 2019-02-14

## 2019-02-14 VITALS
HEIGHT: 72 IN | SYSTOLIC BLOOD PRESSURE: 131 MMHG | WEIGHT: 261 LBS | HEART RATE: 87 BPM | TEMPERATURE: 97.4 F | DIASTOLIC BLOOD PRESSURE: 84 MMHG | RESPIRATION RATE: 18 BRPM | BODY MASS INDEX: 35.35 KG/M2

## 2019-02-14 DIAGNOSIS — K22.70 SHORT-SEGMENT BARRETT'S ESOPHAGUS: ICD-10-CM

## 2019-02-14 DIAGNOSIS — K20.90 ESOPHAGITIS: ICD-10-CM

## 2019-02-14 DIAGNOSIS — R10.13 EPIGASTRIC PAIN: Primary | ICD-10-CM

## 2019-02-14 DIAGNOSIS — R11.0 NAUSEA: ICD-10-CM

## 2019-02-14 PROCEDURE — 99215 OFFICE O/P EST HI 40 MIN: CPT | Performed by: INTERNAL MEDICINE

## 2019-02-14 RX ORDER — GLYCOPYRROLATE 2 MG/1
2 TABLET ORAL DAILY
Qty: 30 TABLET | Refills: 1 | Status: SHIPPED | OUTPATIENT
Start: 2019-02-14

## 2019-02-14 NOTE — PATIENT INSTRUCTIONS
1. Observe and I reflux measures.  2. Low-fat diet.  3. Protonix (Pantoprazole) tablet 40 mg tablet. Take 1 tablet orally in the morning half an hour before eating every day.  4. Glycopyrrolate 2 mg: To start out take 1/4 tablet once a day. The dose may be increased to 1/4 tablet twice a day as needed  for abdominal pain.  5. Review ultrasound of the right upper quadrant.  6. Consider colonoscopy in the future.  7. Further plans may include a CCK HIDA scan after review of the right upper quadrant abdominal ultrasound.  8. Time spent 45 minutes 30 minutes were spent on face-to-face discussion. Discussed with the patient and his wife in detail. Multiple questions were answered. Opportunity was given for additional questions.  9. The patient may call back regarding progress. Otherwise follow-up in 6 weeks.

## 2019-02-14 NOTE — PROGRESS NOTES
"Chief Complaint   Patient presents with   • Abdominal Pain     History of Present Illness     There is history of epigastric abdominal pain off and on for the last 2 years .  The pain is sudden in onset, severe in intensity and stabbing-squeezing in character.  Initially the pain used to be perhaps once in 3 months however, for the last few months the pain is occurring more frequently once to occasionally 2 times a month. The pain lasts for 1-2 days.  There is no radiation of abdominal pain.  The pain is associated with nausea and vomiting and sweating. Eating certain foods worsens the pain.  Taking a hot shower and lying on the right side helps the pain.     The patient has been having recurrent bouts of nausea and vomiting. Last 2 years. The nausea and vomiting or associated with epigastric abdominal pain. Emesis improves the nausea however it does not improve the abdominal pain. The patient has feverish feeling and some chills associated with abdominal pain.  Generally the patient has 1 bowel movement a day however at times the patient has 2 and occasionally 3 stools per day. The patient lost about 40-50 pounds in 8-9 months between December 2016 and August 20 17 which was \"intentional\". The patient has lost about 9 pounds over the last 3-4 weeks.    There is no history of hematemesis, melena or hematochezia. The patient denies diarrhea or constipation as such. He denies reflux. There is no dysphagia or odynophagia. There is no history of liver or pancreatic disease. The patient denies family history of colon cancer, inflammatory bowel disease or chronic liver disease.     Review of Systems   Constitutional: Positive for diaphoresis and fever. Negative for appetite change, chills, fatigue and unexpected weight change.   HENT: Negative for mouth sores, nosebleeds and trouble swallowing.    Eyes: Negative for discharge and redness.   Respiratory: Negative for apnea, cough and shortness of breath.    Cardiovascular: " "Negative for chest pain, palpitations and leg swelling.   Gastrointestinal: Positive for abdominal pain, nausea and vomiting. Negative for abdominal distention, anal bleeding, blood in stool, constipation and diarrhea.   Endocrine: Negative for cold intolerance, heat intolerance and polydipsia.   Genitourinary: Negative for dysuria, hematuria and urgency.   Musculoskeletal: Negative for arthralgias, joint swelling and myalgias.   Skin: Negative for rash.   Allergic/Immunologic: Negative for food allergies and immunocompromised state.   Neurological: Negative for dizziness, seizures, syncope and headaches.   Hematological: Negative for adenopathy. Does not bruise/bleed easily.   Psychiatric/Behavioral: Negative for dysphoric mood. The patient is not nervous/anxious and is not hyperactive.      Patient Active Problem List   Diagnosis   • Epigastric pain   • Nausea and vomiting     Past Medical History:   Diagnosis Date   • Epigastric pain    • Snores      Past Surgical History:   Procedure Laterality Date   • COLONOSCOPY  over 10 years ago   • ENDOSCOPY N/A 12/12/2018    Procedure: ESOPHAGOGASTRODUODENOSCOPY WITH BIOPSIES;  Surgeon: Guido Hill MD;  Location: [facility] ENDOSCOPY;  Service: Gastroenterology   • WISDOM TOOTH EXTRACTION       Family History   Problem Relation Age of Onset   • Colon cancer Maternal Grandfather 80     Social History     Tobacco Use   • Smoking status: Never Smoker   • Smokeless tobacco: Never Used   Substance Use Topics   • Alcohol use: Yes     Comment: OCCASSIONALLY SOCIAL       Current Outpatient Medications:   •  pantoprazole (PROTONIX) 40 MG EC tablet, Take 1 tablet 30 minutes before breakfast daily., Disp: 30 tablet, Rfl: 5  •  glycopyrrolate (ROBINUL) 2 MG tablet, Take 1 tablet by mouth Daily., Disp: 30 tablet, Rfl: 1    No Known Allergies    Blood pressure 131/84, pulse 87, temperature 97.4 °F (36.3 °C), resp. rate 18, height 182.9 cm (72\"), weight 118 kg (261 lb).    Physical Exam "   Constitutional: He is oriented to person, place, and time. He appears well-developed and well-nourished. No distress.   HENT:   Head: Normocephalic and atraumatic.   Right Ear: Hearing and external ear normal.   Left Ear: Hearing and external ear normal.   Nose: Nose normal.   Mouth/Throat: Oropharynx is clear and moist and mucous membranes are normal. Mucous membranes are not pale, not dry and not cyanotic. No oral lesions. No oropharyngeal exudate.   Eyes: Conjunctivae and EOM are normal. Right eye exhibits no discharge. Left eye exhibits no discharge. No scleral icterus.   Neck: Trachea normal. Neck supple. No JVD present. No edema present. No thyroid mass and no thyromegaly present.   Cardiovascular: Normal rate, regular rhythm, S2 normal and normal heart sounds. Exam reveals no gallop, no S3 and no friction rub.   No murmur heard.  Pulmonary/Chest: Effort normal and breath sounds normal. No respiratory distress. He has no wheezes. He has no rales. He exhibits no tenderness.   Abdominal: Soft. Normal appearance and bowel sounds are normal. He exhibits no distension, no ascites and no mass. There is no splenomegaly or hepatomegaly. There is no tenderness. There is no rigidity, no rebound and no guarding. No hernia.   Musculoskeletal: He exhibits no tenderness or deformity.     Vascular Status -  His right foot exhibits no edema. His left foot exhibits no edema.  Lymphadenopathy:     He has no cervical adenopathy.        Left: No supraclavicular adenopathy present.   Neurological: He is alert and oriented to person, place, and time. He has normal strength. No cranial nerve deficit or sensory deficit. He exhibits normal muscle tone. Coordination normal.   Skin: No rash noted. He is not diaphoretic. No cyanosis. No pallor. Nails show no clubbing.   Psychiatric: He has a normal mood and affect. His behavior is normal. Judgment and thought content normal.   Nursing note and vitals reviewed.  Stigmata of chronic  liver disease:  None.  Asterixis:  None.      Laboratory Testing:  Upon review of medical records:    Dated 11/5/2018 glucose 135 BUN 15 creatinine 1.0 sodium 141 potassium 4.4 chloride 104 CO2 28 calcium 9.7 albumin 5.0 ALT 41 AST 25 alkaline phosphatase 64 total bilirubin 0.5 WBC 10.89 hemoglobin 15.9 hematocrit 45.3 platelet count 258 MCV 84.0 lipase 56 troponin <0.012     Abdominal Imaging:  Upon review of records:     CT of abdomen and pelvis with contrast dated 11/5/2018 reveals lung bases are clear. The heart is normal in size. The liver is normal. The spleen is unremarkable. No adrenal mass is present.  The pancreas is normal. The kidneys are normal. The aorta is normal in caliber. There is no free fluid or adenopathy. The abdominal portions of the GI tract are unremarkable with no evidence of obstruction. The appendix is normal.  The urinary bladder is unremarkable. There is no significant free fluid or adenopathy.       Dated 3 1/4/2019 the patient underwent a right upper quadrant abdominal ultrasound which revealed: Probable minimal gallbladder sludge.  No definite stones.  No gallbladder wall thickening or pericholecystic fluid.  No biliary ductal dilatation.  Visualized portions of the hepatic and pancreatic parenchyma demonstrate no focal abdomen abnormalities.  Some portions are mildly obscured by bowel gas.  Survey views of the right kidney show no hydronephrosis.     Procedures:  Upon review of medical records:     Dated December 12, 2018 the patient underwent an upper endoscopy which revealed: Erythematous distal esophagitis.  Small sliding hiatal hernia less than 3 cm.  Erythematous gastritis with an occasional erosion at the gastric antrum.  Evidence of old healed ulceration.  Major ampulla was in part visualized.  Somewhat prominent but still within normal limits.  Good distensibility of the stomach was achieved.  No giant folds were noted.  Second portion of duodenum, biopsy revealed small  "intestinal mucosa with no significant histopathologic abnormalities.  Antrum, body and fundus, biopsies revealed gastric antral and fundic-type mucosa with reactive (chemical) gastropathy.  Negative for intestinal metaplasia or dysplasia.  No Helicobacter pylori-like organisms seen.  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  Squamous mucosa with features suggestive of reflux esophagitis. Negative for specific microorganisms.    Assessment:      ICD-10-CM ICD-9-CM   1. Epigastric pain R10.13 789.06   2. Nausea R11.0 787.02   3. Esophagitis K20.9 530.10   4. Short-segment Alvarez's esophagus K22.70 530.85         Discussion:  1. The patient was found to have erythematous distal esophagitis and short segment gastric type mucosa in the distal esophagus. Biopsies revealed reflux type esophagitis as well \"Alvarez's without dysplasia\". Of interest that the patient is asymptomatic in terms of reflux.    Plan/  Patient Instructions   1. Observe and I reflux measures.  2. Low-fat diet.  3. Protonix (Pantoprazole) tablet 40 mg tablet. Take 1 tablet orally in the morning half an hour before eating every day.  4. Glycopyrrolate 2 mg: To start out take 1/4 tablet once a day. The dose may be increased to 1/4 tablet twice a day as needed  for abdominal pain.  5. Review ultrasound of the right upper quadrant.  6. Consider colonoscopy in the future.  7. Further plans may include a CCK HIDA scan after review of the right upper quadrant abdominal ultrasound.  8. Time spent 45 minutes 30 minutes were spent on face-to-face discussion. Discussed with the patient and his wife in detail. Multiple questions were answered. Opportunity was given for additional questions.  9. The patient may call back regarding progress. Otherwise follow-up in 6 weeks.         Guido Hill MD    "

## 2019-03-28 ENCOUNTER — OFFICE VISIT (OUTPATIENT)
Dept: GASTROENTEROLOGY | Facility: CLINIC | Age: 44
End: 2019-03-28

## 2019-03-28 VITALS
DIASTOLIC BLOOD PRESSURE: 80 MMHG | BODY MASS INDEX: 35.21 KG/M2 | WEIGHT: 260 LBS | HEIGHT: 72 IN | SYSTOLIC BLOOD PRESSURE: 121 MMHG | TEMPERATURE: 98.4 F | RESPIRATION RATE: 18 BRPM | HEART RATE: 86 BPM

## 2019-03-28 DIAGNOSIS — R10.13 EPIGASTRIC PAIN: Primary | ICD-10-CM

## 2019-03-28 DIAGNOSIS — R11.0 NAUSEA: ICD-10-CM

## 2019-03-28 DIAGNOSIS — K20.90 ESOPHAGITIS: ICD-10-CM

## 2019-03-28 PROCEDURE — 99214 OFFICE O/P EST MOD 30 MIN: CPT | Performed by: INTERNAL MEDICINE

## 2019-08-22 DIAGNOSIS — R12 HEARTBURN: ICD-10-CM

## 2019-08-22 RX ORDER — PANTOPRAZOLE SODIUM 40 MG/1
TABLET, DELAYED RELEASE ORAL
Qty: 30 TABLET | Refills: 4 | Status: SHIPPED | OUTPATIENT
Start: 2019-08-22

## 2019-12-09 ENCOUNTER — OFFICE VISIT (OUTPATIENT)
Dept: GASTROENTEROLOGY | Facility: CLINIC | Age: 44
End: 2019-12-09

## 2019-12-09 VITALS
SYSTOLIC BLOOD PRESSURE: 136 MMHG | HEART RATE: 82 BPM | RESPIRATION RATE: 16 BRPM | DIASTOLIC BLOOD PRESSURE: 80 MMHG | HEIGHT: 72 IN | BODY MASS INDEX: 34.95 KG/M2 | WEIGHT: 258 LBS | TEMPERATURE: 98.3 F

## 2019-12-09 DIAGNOSIS — R11.0 NAUSEA: ICD-10-CM

## 2019-12-09 DIAGNOSIS — R10.13 EPIGASTRIC ABDOMINAL PAIN: Primary | ICD-10-CM

## 2019-12-09 PROCEDURE — 99214 OFFICE O/P EST MOD 30 MIN: CPT | Performed by: INTERNAL MEDICINE

## 2019-12-09 RX ORDER — ONDANSETRON 4 MG/1
4 TABLET, FILM COATED ORAL EVERY 8 HOURS PRN
Qty: 30 TABLET | Refills: 1 | Status: SHIPPED | OUTPATIENT
Start: 2019-12-09

## 2019-12-09 NOTE — PATIENT INSTRUCTIONS
1. Low diet.  2. Protonix (Pantoprazole) tablet 40 mg tablet. Take 1 tablet orally in the morning half an hour before eating every day.  3. CCK HIDA scan for gallbladder ejection fraction.  The patient has been advised to call back regarding results.  4. Zofran 4 mg tablet.  1-to 3 times a day by mouth as needed for nausea.    5. Standing order.  Spot urine porphobilinogen during an attack.

## 2019-12-09 NOTE — PROGRESS NOTES
"Chief Complaint   Patient presents with   • Abdominal Pain     History of Present Illness     The patient has history of epigastric abdominal pain off and on for the last 2 years.  The pain is sudden in onset severe in intensity and stabbing-squeezing in character.  The pain used to be perhaps once in 3 months in the past.  Later it became more frequent and neck started occurring perhaps  twice in a month.  The pain had been associated with nausea and vomiting.  Typically he observes an overnight fast and drink some liquids for a day or so with resolution of his symptoms.  The patient has noticed some dark urine during his last attack.    Since March 2019 the patient has been vigilant and observing low-fat diet.  He has done well till July in 2019 when he ate some high fat foods and got sick.  His sickness was \"typical\".  However, the last attack in November 2019 the pain also extended towards the lower chest and periumbilical area.    The patient denies hematemesis, melena or hematochezia.  There is no diarrhea or constipation.  He denies reflux.  There is no dysphagia or odynophagia.  There is no history of liver or pancreatic disease.  The patient has been observing \"diet\" and had lost about 60 pounds since December 2016.  This also includes 12 pound weight loss over the last 1 year.     Review of Systems   Constitutional: Negative for appetite change, chills, fatigue, fever and unexpected weight change.   HENT: Negative for mouth sores, nosebleeds and trouble swallowing.    Eyes: Negative for discharge and redness.   Respiratory: Negative for apnea, cough and shortness of breath.    Cardiovascular: Negative for chest pain, palpitations and leg swelling.   Gastrointestinal: Positive for abdominal pain, nausea and vomiting. Negative for abdominal distention, anal bleeding, blood in stool, constipation and diarrhea.   Endocrine: Negative for cold intolerance, heat intolerance and polydipsia.   Genitourinary: " "Negative for dysuria, hematuria and urgency.   Musculoskeletal: Negative for arthralgias, joint swelling and myalgias.   Skin: Negative for rash.   Allergic/Immunologic: Negative for food allergies and immunocompromised state.   Neurological: Negative for dizziness, seizures, syncope and headaches.   Hematological: Negative for adenopathy. Does not bruise/bleed easily.   Psychiatric/Behavioral: Negative for dysphoric mood. The patient is not nervous/anxious and is not hyperactive.      Patient Active Problem List   Diagnosis   • Epigastric pain   • Nausea and vomiting     Past Medical History:   Diagnosis Date   • Epigastric pain    • Snores      Past Surgical History:   Procedure Laterality Date   • COLONOSCOPY  over 10 years ago   • ENDOSCOPY N/A 12/12/2018    Procedure: ESOPHAGOGASTRODUODENOSCOPY WITH BIOPSIES;  Surgeon: Guido Hill MD;  Location: [facility] ENDOSCOPY;  Service: Gastroenterology   • WISDOM TOOTH EXTRACTION       Family History   Problem Relation Age of Onset   • Colon cancer Maternal Grandfather 80     Social History     Tobacco Use   • Smoking status: Never Smoker   • Smokeless tobacco: Never Used   Substance Use Topics   • Alcohol use: Yes     Comment: OCCASSIONALLY SOCIAL       Current Outpatient Medications:   •  pantoprazole (PROTONIX) 40 MG EC tablet, TAKE 1 TABLET BY MOUTH DAILY 30 MINUTES BEFORE BREAKFAST, Disp: 30 tablet, Rfl: 4  •  glycopyrrolate (ROBINUL) 2 MG tablet, Take 1 tablet by mouth Daily. (Patient taking differently: Take 2 mg by mouth As Needed.), Disp: 30 tablet, Rfl: 1  •  ondansetron (ZOFRAN) 4 MG tablet, Take 1 tablet by mouth Every 8 (Eight) Hours As Needed for Nausea or Vomiting., Disp: 30 tablet, Rfl: 1    No Known Allergies    Blood pressure 136/80, pulse 82, temperature 98.3 °F (36.8 °C), resp. rate 16, height 182.9 cm (72\"), weight 117 kg (258 lb).    Physical Exam   Constitutional: He is oriented to person, place, and time. He appears well-developed and " well-nourished. No distress.   HENT:   Head: Normocephalic and atraumatic.   Right Ear: Hearing and external ear normal.   Left Ear: Hearing and external ear normal.   Nose: Nose normal.   Mouth/Throat: Oropharynx is clear and moist and mucous membranes are normal. Mucous membranes are not pale, not dry and not cyanotic. No oral lesions. No oropharyngeal exudate.   Eyes: Conjunctivae and EOM are normal. Right eye exhibits no discharge. Left eye exhibits no discharge. No scleral icterus.   Neck: Trachea normal. Neck supple. No JVD present. No edema present. No thyroid mass and no thyromegaly present.   Cardiovascular: Normal rate, regular rhythm, S2 normal and normal heart sounds. Exam reveals no gallop, no S3 and no friction rub.   No murmur heard.  Pulmonary/Chest: Effort normal and breath sounds normal. No respiratory distress. He has no wheezes. He has no rales. He exhibits no tenderness.   Abdominal: Soft. Normal appearance and bowel sounds are normal. He exhibits no distension, no ascites and no mass. There is no splenomegaly or hepatomegaly. There is no tenderness. There is no rigidity, no rebound and no guarding. No hernia.   Musculoskeletal: He exhibits no tenderness or deformity.     Vascular Status -  His right foot exhibits no edema. His left foot exhibits no edema.  Lymphadenopathy:     He has no cervical adenopathy.        Left: No supraclavicular adenopathy present.   Neurological: He is alert and oriented to person, place, and time. He has normal strength. No cranial nerve deficit or sensory deficit. He exhibits normal muscle tone. Coordination normal.   Skin: No rash noted. He is not diaphoretic. No cyanosis. No pallor. Nails show no clubbing.   Psychiatric: He has a normal mood and affect. His behavior is normal. Judgment and thought content normal.   Nursing note and vitals reviewed.  Stigmata of chronic liver disease:  None.  Asterixis:  None.    Laboratory Testing:  Upon review of medical  records:    Dated 11/5/2018 glucose 135 BUN 15 creatinine 1.0 sodium 141 potassium 4.4 chloride 104 CO2 28 calcium 9.7 albumin 5.0 ALT 41 AST 25 alkaline phosphatase 64 total bilirubin 0.5 WBC 10.89 hemoglobin 15.9 hematocrit 45.3 platelet count 258 MCV 84.0 lipase 56 troponin <0.012     Abdominal Imaging:  Upon review of records:     CT of abdomen and pelvis with contrast dated 11/5/2018 reveals lung bases are clear. The heart is normal in size. The liver is normal. The spleen is unremarkable. No adrenal mass is present.  The pancreas is normal. The kidneys are normal. The aorta is normal in caliber. There is no free fluid or adenopathy. The abdominal portions of the GI tract are unremarkable with no evidence of obstruction. The appendix is normal.  The urinary bladder is unremarkable. There is no significant free fluid or adenopathy.        Dated February 4, 2019 the patient underwent a right upper quadrant abdominal ultrasound which revealed: Probable minimal gallbladder sludge.  No definite stones.  No gallbladder wall thickening or pericholecystic fluid.  No biliary ductal dilatation.  Visualized portions of the hepatic and pancreatic parenchyma demonstrate no focal abdomen abnormalities.  Some portions are mildly obscured by bowel gas.  Survey views of the right kidney show no hydronephrosis.     Procedures:  Upon review of medical records:     Dated December 12, 2018 the patient underwent an upper endoscopy which revealed: Erythematous distal esophagitis.  Small sliding hiatal hernia less than 3 cm.  Erythematous gastritis with an occasional erosion at the gastric antrum.  Evidence of old healed ulceration.  Major ampulla was in part visualized.  Somewhat prominent but still within normal limits.  Good distensibility of the stomach was achieved.  No giant folds were noted.  Second portion of duodenum, biopsy revealed small intestinal mucosa with no significant histopathologic abnormalities.  Antrum, body and  fundus, biopsies revealed gastric antral and fundic-type mucosa with reactive (chemical) gastropathy.  Negative for intestinal metaplasia or dysplasia.  No Helicobacter pylori-like organisms seen.  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  Squamous mucosa with features suggestive of reflux esophagitis. Negative for specific microorganisms.    Assessment:      ICD-10-CM ICD-9-CM   1. Epigastric abdominal pain R10.13 789.06   2. Nausea R11.0 787.02         Discussion:  1.     Plan/  Patient Instructions   1. Low diet.  2. Protonix (Pantoprazole) tablet 40 mg tablet. Take 1 tablet orally in the morning half an hour before eating every day.  3. CCK HIDA scan for gallbladder ejection fraction.  The patient has been advised to call back regarding results.  4. Zofran 4 mg tablet.  1-to 3 times a day by mouth as needed for nausea.    5. Standing order.  Spot urine porphobilinogen during an attack.       Guido Hill MD

## 2020-07-01 ENCOUNTER — TELEPHONE (OUTPATIENT)
Dept: GASTROENTEROLOGY | Facility: CLINIC | Age: 45
End: 2020-07-01

## 2020-07-27 ENCOUNTER — TELEPHONE (OUTPATIENT)
Dept: GASTROENTEROLOGY | Facility: CLINIC | Age: 45
End: 2020-07-27

## 2020-08-20 ENCOUNTER — TELEPHONE (OUTPATIENT)
Dept: GASTROENTEROLOGY | Facility: CLINIC | Age: 45
End: 2020-08-20

## 2023-10-09 RX ORDER — SODIUM, POTASSIUM,MAG SULFATES 17.5-3.13G
1 SOLUTION, RECONSTITUTED, ORAL ORAL TAKE AS DIRECTED
Qty: 354 ML | Refills: 0 | Status: SHIPPED | OUTPATIENT
Start: 2023-10-09

## 2023-10-16 ENCOUNTER — OUTSIDE FACILITY SERVICE (OUTPATIENT)
Dept: GASTROENTEROLOGY | Facility: CLINIC | Age: 48
End: 2023-10-16
Payer: COMMERCIAL

## 2023-10-16 PROCEDURE — 45378 DIAGNOSTIC COLONOSCOPY: CPT | Performed by: INTERNAL MEDICINE

## 2023-10-16 PROCEDURE — 43239 EGD BIOPSY SINGLE/MULTIPLE: CPT | Performed by: INTERNAL MEDICINE

## 2023-10-16 PROCEDURE — 88305 TISSUE EXAM BY PATHOLOGIST: CPT

## 2023-10-17 ENCOUNTER — LAB REQUISITION (OUTPATIENT)
Dept: LAB | Facility: HOSPITAL | Age: 48
End: 2023-10-17
Payer: COMMERCIAL

## 2023-10-17 DIAGNOSIS — Z12.11 ENCOUNTER FOR SCREENING FOR MALIGNANT NEOPLASM OF COLON: ICD-10-CM

## 2023-10-17 DIAGNOSIS — K44.9 DIAPHRAGMATIC HERNIA WITHOUT OBSTRUCTION OR GANGRENE: ICD-10-CM

## 2023-10-17 DIAGNOSIS — K21.00 GASTRO-ESOPHAGEAL REFLUX DISEASE WITH ESOPHAGITIS, WITHOUT BLEEDING: ICD-10-CM

## 2023-10-17 DIAGNOSIS — K22.70 BARRETT'S ESOPHAGUS WITHOUT DYSPLASIA: ICD-10-CM

## 2023-10-18 LAB — REF LAB TEST METHOD: NORMAL

## 2024-08-03 ENCOUNTER — ANESTHESIA EVENT (OUTPATIENT)
Dept: PERIOP | Facility: HOSPITAL | Age: 49
End: 2024-08-03
Payer: COMMERCIAL

## 2024-08-03 ENCOUNTER — APPOINTMENT (OUTPATIENT)
Dept: CT IMAGING | Facility: HOSPITAL | Age: 49
DRG: 853 | End: 2024-08-03
Payer: COMMERCIAL

## 2024-08-03 ENCOUNTER — ANESTHESIA (OUTPATIENT)
Dept: PERIOP | Facility: HOSPITAL | Age: 49
End: 2024-08-03
Payer: COMMERCIAL

## 2024-08-03 ENCOUNTER — HOSPITAL ENCOUNTER (INPATIENT)
Facility: HOSPITAL | Age: 49
LOS: 1 days | Discharge: HOME | DRG: 853 | End: 2024-08-05
Attending: STUDENT IN AN ORGANIZED HEALTH CARE EDUCATION/TRAINING PROGRAM | Admitting: SURGERY
Payer: COMMERCIAL

## 2024-08-03 DIAGNOSIS — K35.80 ACUTE APPENDICITIS, UNSPECIFIED ACUTE APPENDICITIS TYPE: Primary | ICD-10-CM

## 2024-08-03 DIAGNOSIS — K37 APPENDICITIS: ICD-10-CM

## 2024-08-03 DIAGNOSIS — K35.32 RUPTURED APPENDICITIS: ICD-10-CM

## 2024-08-03 LAB
ALBUMIN SERPL-MCNC: 4.1 G/DL (ref 3.5–5.2)
ALBUMIN/GLOB SERPL: 1.2 G/DL
ALP SERPL-CCNC: 95 U/L (ref 39–117)
ALT SERPL W P-5'-P-CCNC: 52 U/L (ref 1–41)
ANION GAP SERPL CALCULATED.3IONS-SCNC: 13.1 MMOL/L (ref 5–15)
ANION GAP SERPL CALCULATED.3IONS-SCNC: 16.6 MMOL/L (ref 5–15)
AST SERPL-CCNC: 38 U/L (ref 1–40)
BACTERIA UR QL AUTO: ABNORMAL /HPF
BASOPHILS # BLD AUTO: 0.05 10*3/MM3 (ref 0–0.2)
BASOPHILS # BLD AUTO: 0.06 10*3/MM3 (ref 0–0.2)
BASOPHILS NFR BLD AUTO: 0.4 % (ref 0–1.5)
BASOPHILS NFR BLD AUTO: 0.5 % (ref 0–1.5)
BILIRUB SERPL-MCNC: 1.6 MG/DL (ref 0–1.2)
BILIRUB UR QL STRIP: ABNORMAL
BUN SERPL-MCNC: 17 MG/DL (ref 6–20)
BUN SERPL-MCNC: 19 MG/DL (ref 6–20)
BUN/CREAT SERPL: 14.1 (ref 7–25)
BUN/CREAT SERPL: 16.3 (ref 7–25)
CALCIUM SPEC-SCNC: 8.2 MG/DL (ref 8.6–10.5)
CALCIUM SPEC-SCNC: 8.9 MG/DL (ref 8.6–10.5)
CHLORIDE SERPL-SCNC: 95 MMOL/L (ref 98–107)
CHLORIDE SERPL-SCNC: 97 MMOL/L (ref 98–107)
CLARITY UR: ABNORMAL
CO2 SERPL-SCNC: 20.9 MMOL/L (ref 22–29)
CO2 SERPL-SCNC: 21.4 MMOL/L (ref 22–29)
COLOR UR: ABNORMAL
CREAT SERPL-MCNC: 1.04 MG/DL (ref 0.76–1.27)
CREAT SERPL-MCNC: 1.35 MG/DL (ref 0.76–1.27)
D-LACTATE SERPL-SCNC: 1.7 MMOL/L (ref 0.5–2)
D-LACTATE SERPL-SCNC: 2.3 MMOL/L (ref 0.5–2)
DEPRECATED RDW RBC AUTO: 39.2 FL (ref 37–54)
DEPRECATED RDW RBC AUTO: 40.7 FL (ref 37–54)
EGFRCR SERPLBLD CKD-EPI 2021: 65.6 ML/MIN/1.73
EGFRCR SERPLBLD CKD-EPI 2021: 89.7 ML/MIN/1.73
EOSINOPHIL # BLD AUTO: 0.02 10*3/MM3 (ref 0–0.4)
EOSINOPHIL # BLD AUTO: 0.17 10*3/MM3 (ref 0–0.4)
EOSINOPHIL NFR BLD AUTO: 0.2 % (ref 0.3–6.2)
EOSINOPHIL NFR BLD AUTO: 1.2 % (ref 0.3–6.2)
ERYTHROCYTE [DISTWIDTH] IN BLOOD BY AUTOMATED COUNT: 12.7 % (ref 12.3–15.4)
ERYTHROCYTE [DISTWIDTH] IN BLOOD BY AUTOMATED COUNT: 13 % (ref 12.3–15.4)
GLOBULIN UR ELPH-MCNC: 3.4 GM/DL
GLUCOSE SERPL-MCNC: 150 MG/DL (ref 65–99)
GLUCOSE SERPL-MCNC: 168 MG/DL (ref 65–99)
GLUCOSE UR STRIP-MCNC: NEGATIVE MG/DL
HBA1C MFR BLD: 5.2 % (ref 4.8–5.6)
HCT VFR BLD AUTO: 42.6 % (ref 37.5–51)
HCT VFR BLD AUTO: 46.2 % (ref 37.5–51)
HGB BLD-MCNC: 14.8 G/DL (ref 13–17.7)
HGB BLD-MCNC: 16.3 G/DL (ref 13–17.7)
HGB UR QL STRIP.AUTO: ABNORMAL
HOLD SPECIMEN: NORMAL
HOLD SPECIMEN: NORMAL
HYALINE CASTS UR QL AUTO: ABNORMAL /LPF
IMM GRANULOCYTES # BLD AUTO: 0.06 10*3/MM3 (ref 0–0.05)
IMM GRANULOCYTES # BLD AUTO: 0.06 10*3/MM3 (ref 0–0.05)
IMM GRANULOCYTES NFR BLD AUTO: 0.4 % (ref 0–0.5)
IMM GRANULOCYTES NFR BLD AUTO: 0.5 % (ref 0–0.5)
KETONES UR QL STRIP: ABNORMAL
LEUKOCYTE ESTERASE UR QL STRIP.AUTO: ABNORMAL
LIPASE SERPL-CCNC: 14 U/L (ref 13–60)
LYMPHOCYTES # BLD AUTO: 0.51 10*3/MM3 (ref 0.7–3.1)
LYMPHOCYTES # BLD AUTO: 1.01 10*3/MM3 (ref 0.7–3.1)
LYMPHOCYTES NFR BLD AUTO: 3.7 % (ref 19.6–45.3)
LYMPHOCYTES NFR BLD AUTO: 8.5 % (ref 19.6–45.3)
MAGNESIUM SERPL-MCNC: 1.7 MG/DL (ref 1.6–2.6)
MCH RBC QN AUTO: 29.7 PG (ref 26.6–33)
MCH RBC QN AUTO: 29.8 PG (ref 26.6–33)
MCHC RBC AUTO-ENTMCNC: 34.7 G/DL (ref 31.5–35.7)
MCHC RBC AUTO-ENTMCNC: 35.3 G/DL (ref 31.5–35.7)
MCV RBC AUTO: 84.3 FL (ref 79–97)
MCV RBC AUTO: 85.9 FL (ref 79–97)
MONOCYTES # BLD AUTO: 0.45 10*3/MM3 (ref 0.1–0.9)
MONOCYTES # BLD AUTO: 0.87 10*3/MM3 (ref 0.1–0.9)
MONOCYTES NFR BLD AUTO: 3.3 % (ref 5–12)
MONOCYTES NFR BLD AUTO: 7.3 % (ref 5–12)
NEUTROPHILS NFR BLD AUTO: 12.41 10*3/MM3 (ref 1.7–7)
NEUTROPHILS NFR BLD AUTO: 83 % (ref 42.7–76)
NEUTROPHILS NFR BLD AUTO: 9.85 10*3/MM3 (ref 1.7–7)
NEUTROPHILS NFR BLD AUTO: 91 % (ref 42.7–76)
NITRITE UR QL STRIP: POSITIVE
NRBC BLD AUTO-RTO: 0 /100 WBC (ref 0–0.2)
NRBC BLD AUTO-RTO: 0 /100 WBC (ref 0–0.2)
PH UR STRIP.AUTO: 5.5 [PH] (ref 5–8)
PHOSPHATE SERPL-MCNC: 2.4 MG/DL (ref 2.5–4.5)
PLATELET # BLD AUTO: 147 10*3/MM3 (ref 140–450)
PLATELET # BLD AUTO: 168 10*3/MM3 (ref 140–450)
PMV BLD AUTO: 10.3 FL (ref 6–12)
PMV BLD AUTO: 10.4 FL (ref 6–12)
POTASSIUM SERPL-SCNC: 3.6 MMOL/L (ref 3.5–5.2)
POTASSIUM SERPL-SCNC: 4.6 MMOL/L (ref 3.5–5.2)
PROCALCITONIN SERPL-MCNC: 16.94 NG/ML (ref 0–0.25)
PROT SERPL-MCNC: 7.5 G/DL (ref 6–8.5)
PROT UR QL STRIP: ABNORMAL
RBC # BLD AUTO: 4.96 10*6/MM3 (ref 4.14–5.8)
RBC # BLD AUTO: 5.48 10*6/MM3 (ref 4.14–5.8)
RBC # UR STRIP: ABNORMAL /HPF
REF LAB TEST METHOD: ABNORMAL
SODIUM SERPL-SCNC: 131 MMOL/L (ref 136–145)
SODIUM SERPL-SCNC: 133 MMOL/L (ref 136–145)
SP GR UR STRIP: >=1.03 (ref 1–1.03)
SQUAMOUS #/AREA URNS HPF: ABNORMAL /HPF
UROBILINOGEN UR QL STRIP: ABNORMAL
WBC # UR STRIP: ABNORMAL /HPF
WBC NRBC COR # BLD AUTO: 11.87 10*3/MM3 (ref 3.4–10.8)
WBC NRBC COR # BLD AUTO: 13.65 10*3/MM3 (ref 3.4–10.8)
WHOLE BLOOD HOLD COAG: NORMAL
WHOLE BLOOD HOLD SPECIMEN: NORMAL

## 2024-08-03 PROCEDURE — 87075 CULTR BACTERIA EXCEPT BLOOD: CPT | Performed by: SURGERY

## 2024-08-03 PROCEDURE — 44970 LAPAROSCOPY APPENDECTOMY: CPT | Performed by: SURGERY

## 2024-08-03 PROCEDURE — 25010000002 PROPOFOL 10 MG/ML EMULSION: Performed by: NURSE ANESTHETIST, CERTIFIED REGISTERED

## 2024-08-03 PROCEDURE — 0DTJ4ZZ RESECTION OF APPENDIX, PERCUTANEOUS ENDOSCOPIC APPROACH: ICD-10-PCS | Performed by: SURGERY

## 2024-08-03 PROCEDURE — 25010000002 HYDROMORPHONE 1 MG/ML SOLUTION: Performed by: NURSE ANESTHETIST, CERTIFIED REGISTERED

## 2024-08-03 PROCEDURE — 25010000002 MIDAZOLAM PER 1MG: Performed by: NURSE ANESTHETIST, CERTIFIED REGISTERED

## 2024-08-03 PROCEDURE — 93005 ELECTROCARDIOGRAM TRACING: CPT | Performed by: STUDENT IN AN ORGANIZED HEALTH CARE EDUCATION/TRAINING PROGRAM

## 2024-08-03 PROCEDURE — 25810000003 LACTATED RINGERS PER 1000 ML: Performed by: NURSE ANESTHETIST, CERTIFIED REGISTERED

## 2024-08-03 PROCEDURE — 0W9G4ZX DRAINAGE OF PERITONEAL CAVITY, PERCUTANEOUS ENDOSCOPIC APPROACH, DIAGNOSTIC: ICD-10-PCS | Performed by: SURGERY

## 2024-08-03 PROCEDURE — 25010000002 MORPHINE PER 10 MG: Performed by: STUDENT IN AN ORGANIZED HEALTH CARE EDUCATION/TRAINING PROGRAM

## 2024-08-03 PROCEDURE — 83735 ASSAY OF MAGNESIUM: CPT | Performed by: FAMILY MEDICINE

## 2024-08-03 PROCEDURE — 85025 COMPLETE CBC W/AUTO DIFF WBC: CPT | Performed by: FAMILY MEDICINE

## 2024-08-03 PROCEDURE — 87040 BLOOD CULTURE FOR BACTERIA: CPT | Performed by: STUDENT IN AN ORGANIZED HEALTH CARE EDUCATION/TRAINING PROGRAM

## 2024-08-03 PROCEDURE — 87205 SMEAR GRAM STAIN: CPT | Performed by: SURGERY

## 2024-08-03 PROCEDURE — 25010000002 FENTANYL CITRATE (PF) 50 MCG/ML SOLUTION PREFILLED SYRINGE: Performed by: NURSE ANESTHETIST, CERTIFIED REGISTERED

## 2024-08-03 PROCEDURE — 74177 CT ABD & PELVIS W/CONTRAST: CPT

## 2024-08-03 PROCEDURE — 25010000002 BUPIVACAINE (PF) 0.5 % SOLUTION: Performed by: SURGERY

## 2024-08-03 PROCEDURE — 87186 SC STD MICRODIL/AGAR DIL: CPT | Performed by: STUDENT IN AN ORGANIZED HEALTH CARE EDUCATION/TRAINING PROGRAM

## 2024-08-03 PROCEDURE — 25010000002 SUGAMMADEX 200 MG/2ML SOLUTION: Performed by: NURSE ANESTHETIST, CERTIFIED REGISTERED

## 2024-08-03 PROCEDURE — 84100 ASSAY OF PHOSPHORUS: CPT | Performed by: FAMILY MEDICINE

## 2024-08-03 PROCEDURE — 87070 CULTURE OTHR SPECIMN AEROBIC: CPT | Performed by: SURGERY

## 2024-08-03 PROCEDURE — 83605 ASSAY OF LACTIC ACID: CPT | Performed by: STUDENT IN AN ORGANIZED HEALTH CARE EDUCATION/TRAINING PROGRAM

## 2024-08-03 PROCEDURE — 87077 CULTURE AEROBIC IDENTIFY: CPT | Performed by: SURGERY

## 2024-08-03 PROCEDURE — 25010000002 ONDANSETRON PER 1 MG: Performed by: NURSE ANESTHETIST, CERTIFIED REGISTERED

## 2024-08-03 PROCEDURE — 99223 1ST HOSP IP/OBS HIGH 75: CPT | Performed by: FAMILY MEDICINE

## 2024-08-03 PROCEDURE — 25510000001 IOPAMIDOL 61 % SOLUTION: Performed by: STUDENT IN AN ORGANIZED HEALTH CARE EDUCATION/TRAINING PROGRAM

## 2024-08-03 PROCEDURE — 25010000002 METOCLOPRAMIDE PER 10 MG: Performed by: NURSE ANESTHETIST, CERTIFIED REGISTERED

## 2024-08-03 PROCEDURE — 99285 EMERGENCY DEPT VISIT HI MDM: CPT | Performed by: SURGERY

## 2024-08-03 PROCEDURE — 80053 COMPREHEN METABOLIC PANEL: CPT | Performed by: STUDENT IN AN ORGANIZED HEALTH CARE EDUCATION/TRAINING PROGRAM

## 2024-08-03 PROCEDURE — 36415 COLL VENOUS BLD VENIPUNCTURE: CPT

## 2024-08-03 PROCEDURE — 87186 SC STD MICRODIL/AGAR DIL: CPT | Performed by: SURGERY

## 2024-08-03 PROCEDURE — 87154 CUL TYP ID BLD PTHGN 6+ TRGT: CPT | Performed by: STUDENT IN AN ORGANIZED HEALTH CARE EDUCATION/TRAINING PROGRAM

## 2024-08-03 PROCEDURE — 85025 COMPLETE CBC W/AUTO DIFF WBC: CPT | Performed by: STUDENT IN AN ORGANIZED HEALTH CARE EDUCATION/TRAINING PROGRAM

## 2024-08-03 PROCEDURE — 25810000003 LACTATED RINGERS SOLUTION: Performed by: STUDENT IN AN ORGANIZED HEALTH CARE EDUCATION/TRAINING PROGRAM

## 2024-08-03 PROCEDURE — 84145 PROCALCITONIN (PCT): CPT | Performed by: FAMILY MEDICINE

## 2024-08-03 PROCEDURE — 8E0W4CZ ROBOTIC ASSISTED PROCEDURE OF TRUNK REGION, PERCUTANEOUS ENDOSCOPIC APPROACH: ICD-10-PCS | Performed by: SURGERY

## 2024-08-03 PROCEDURE — G0378 HOSPITAL OBSERVATION PER HR: HCPCS

## 2024-08-03 PROCEDURE — 87015 SPECIMEN INFECT AGNT CONCNTJ: CPT | Performed by: SURGERY

## 2024-08-03 PROCEDURE — 99285 EMERGENCY DEPT VISIT HI MDM: CPT

## 2024-08-03 PROCEDURE — 25010000002 PIPERACILLIN SOD-TAZOBACTAM PER 1 G: Performed by: STUDENT IN AN ORGANIZED HEALTH CARE EDUCATION/TRAINING PROGRAM

## 2024-08-03 PROCEDURE — 81001 URINALYSIS AUTO W/SCOPE: CPT | Performed by: STUDENT IN AN ORGANIZED HEALTH CARE EDUCATION/TRAINING PROGRAM

## 2024-08-03 PROCEDURE — 25010000002 DEXAMETHASONE PER 1 MG: Performed by: NURSE ANESTHETIST, CERTIFIED REGISTERED

## 2024-08-03 PROCEDURE — 83036 HEMOGLOBIN GLYCOSYLATED A1C: CPT | Performed by: FAMILY MEDICINE

## 2024-08-03 PROCEDURE — 83690 ASSAY OF LIPASE: CPT | Performed by: STUDENT IN AN ORGANIZED HEALTH CARE EDUCATION/TRAINING PROGRAM

## 2024-08-03 PROCEDURE — 25010000002 ONDANSETRON PER 1 MG: Performed by: STUDENT IN AN ORGANIZED HEALTH CARE EDUCATION/TRAINING PROGRAM

## 2024-08-03 DEVICE — STAPLER 60 RELOAD WHITE
Type: IMPLANTABLE DEVICE | Site: ABDOMEN | Status: FUNCTIONAL
Brand: SUREFORM

## 2024-08-03 RX ORDER — BISACODYL 10 MG
10 SUPPOSITORY, RECTAL RECTAL DAILY PRN
Status: DISCONTINUED | OUTPATIENT
Start: 2024-08-03 | End: 2024-08-05 | Stop reason: HOSPADM

## 2024-08-03 RX ORDER — SODIUM CHLORIDE 9 MG/ML
40 INJECTION, SOLUTION INTRAVENOUS AS NEEDED
Status: DISCONTINUED | OUTPATIENT
Start: 2024-08-03 | End: 2024-08-05 | Stop reason: HOSPADM

## 2024-08-03 RX ORDER — MEPERIDINE HYDROCHLORIDE 25 MG/ML
25 INJECTION INTRAMUSCULAR; INTRAVENOUS; SUBCUTANEOUS
Status: DISCONTINUED | OUTPATIENT
Start: 2024-08-03 | End: 2024-08-03 | Stop reason: HOSPADM

## 2024-08-03 RX ORDER — ONDANSETRON 2 MG/ML
INJECTION INTRAMUSCULAR; INTRAVENOUS AS NEEDED
Status: DISCONTINUED | OUTPATIENT
Start: 2024-08-03 | End: 2024-08-03 | Stop reason: SURG

## 2024-08-03 RX ORDER — NALOXONE HCL 0.4 MG/ML
0.1 VIAL (ML) INJECTION
Status: DISCONTINUED | OUTPATIENT
Start: 2024-08-03 | End: 2024-08-05 | Stop reason: HOSPADM

## 2024-08-03 RX ORDER — POLYETHYLENE GLYCOL 3350 17 G/17G
17 POWDER, FOR SOLUTION ORAL DAILY PRN
Status: DISCONTINUED | OUTPATIENT
Start: 2024-08-03 | End: 2024-08-05 | Stop reason: HOSPADM

## 2024-08-03 RX ORDER — METOCLOPRAMIDE HYDROCHLORIDE 5 MG/ML
INJECTION INTRAMUSCULAR; INTRAVENOUS AS NEEDED
Status: DISCONTINUED | OUTPATIENT
Start: 2024-08-03 | End: 2024-08-03 | Stop reason: SURG

## 2024-08-03 RX ORDER — METOPROLOL TARTRATE 1 MG/ML
INJECTION, SOLUTION INTRAVENOUS AS NEEDED
Status: DISCONTINUED | OUTPATIENT
Start: 2024-08-03 | End: 2024-08-03 | Stop reason: SURG

## 2024-08-03 RX ORDER — ONDANSETRON 2 MG/ML
4 INJECTION INTRAMUSCULAR; INTRAVENOUS ONCE
Status: COMPLETED | OUTPATIENT
Start: 2024-08-03 | End: 2024-08-03

## 2024-08-03 RX ORDER — KETAMINE HCL IN NACL, ISO-OSM 100MG/10ML
SYRINGE (ML) INJECTION AS NEEDED
Status: DISCONTINUED | OUTPATIENT
Start: 2024-08-03 | End: 2024-08-03 | Stop reason: SURG

## 2024-08-03 RX ORDER — LORAZEPAM 2 MG/ML
1 INJECTION INTRAMUSCULAR EVERY 4 HOURS PRN
Status: DISCONTINUED | OUTPATIENT
Start: 2024-08-03 | End: 2024-08-03 | Stop reason: HOSPADM

## 2024-08-03 RX ORDER — SODIUM CHLORIDE 0.9 % (FLUSH) 0.9 %
3 SYRINGE (ML) INJECTION EVERY 12 HOURS SCHEDULED
Status: DISCONTINUED | OUTPATIENT
Start: 2024-08-03 | End: 2024-08-05 | Stop reason: HOSPADM

## 2024-08-03 RX ORDER — MIDAZOLAM HYDROCHLORIDE 2 MG/2ML
INJECTION, SOLUTION INTRAMUSCULAR; INTRAVENOUS AS NEEDED
Status: DISCONTINUED | OUTPATIENT
Start: 2024-08-03 | End: 2024-08-03 | Stop reason: SURG

## 2024-08-03 RX ORDER — BUPIVACAINE HYDROCHLORIDE 5 MG/ML
INJECTION, SOLUTION EPIDURAL; INTRACAUDAL AS NEEDED
Status: DISCONTINUED | OUTPATIENT
Start: 2024-08-03 | End: 2024-08-03 | Stop reason: HOSPADM

## 2024-08-03 RX ORDER — SUCCINYLCHOLINE/SOD CL,ISO/PF 200MG/10ML
SYRINGE (ML) INTRAVENOUS AS NEEDED
Status: DISCONTINUED | OUTPATIENT
Start: 2024-08-03 | End: 2024-08-03 | Stop reason: SURG

## 2024-08-03 RX ORDER — NITROGLYCERIN 0.4 MG/1
0.4 TABLET SUBLINGUAL
Status: DISCONTINUED | OUTPATIENT
Start: 2024-08-03 | End: 2024-08-05 | Stop reason: HOSPADM

## 2024-08-03 RX ORDER — FENTANYL CITRATE 50 UG/ML
INJECTION, SOLUTION INTRAMUSCULAR; INTRAVENOUS AS NEEDED
Status: DISCONTINUED | OUTPATIENT
Start: 2024-08-03 | End: 2024-08-03 | Stop reason: SURG

## 2024-08-03 RX ORDER — HYDROMORPHONE HYDROCHLORIDE 2 MG/ML
1 INJECTION, SOLUTION INTRAMUSCULAR; INTRAVENOUS; SUBCUTANEOUS
Status: DISCONTINUED | OUTPATIENT
Start: 2024-08-03 | End: 2024-08-05 | Stop reason: HOSPADM

## 2024-08-03 RX ORDER — SODIUM CHLORIDE 0.9 % (FLUSH) 0.9 %
10 SYRINGE (ML) INJECTION AS NEEDED
Status: DISCONTINUED | OUTPATIENT
Start: 2024-08-03 | End: 2024-08-03

## 2024-08-03 RX ORDER — PHENYLEPHRINE HCL IN 0.9% NACL 1 MG/10 ML
SYRINGE (ML) INTRAVENOUS AS NEEDED
Status: DISCONTINUED | OUTPATIENT
Start: 2024-08-03 | End: 2024-08-03 | Stop reason: SURG

## 2024-08-03 RX ORDER — BISACODYL 5 MG/1
5 TABLET, DELAYED RELEASE ORAL DAILY PRN
Status: DISCONTINUED | OUTPATIENT
Start: 2024-08-03 | End: 2024-08-05 | Stop reason: HOSPADM

## 2024-08-03 RX ORDER — HYDROCODONE BITARTRATE AND ACETAMINOPHEN 7.5; 325 MG/1; MG/1
1 TABLET ORAL EVERY 4 HOURS PRN
Status: DISCONTINUED | OUTPATIENT
Start: 2024-08-03 | End: 2024-08-05 | Stop reason: HOSPADM

## 2024-08-03 RX ORDER — PROPOFOL 10 MG/ML
VIAL (ML) INTRAVENOUS AS NEEDED
Status: DISCONTINUED | OUTPATIENT
Start: 2024-08-03 | End: 2024-08-03 | Stop reason: SURG

## 2024-08-03 RX ORDER — ROCURONIUM BROMIDE 50 MG/5 ML
SYRINGE (ML) INTRAVENOUS AS NEEDED
Status: DISCONTINUED | OUTPATIENT
Start: 2024-08-03 | End: 2024-08-03 | Stop reason: SURG

## 2024-08-03 RX ORDER — SODIUM CHLORIDE 0.9 % (FLUSH) 0.9 %
3-10 SYRINGE (ML) INJECTION AS NEEDED
Status: DISCONTINUED | OUTPATIENT
Start: 2024-08-03 | End: 2024-08-05 | Stop reason: HOSPADM

## 2024-08-03 RX ORDER — AMOXICILLIN 250 MG
2 CAPSULE ORAL 2 TIMES DAILY PRN
Status: DISCONTINUED | OUTPATIENT
Start: 2024-08-03 | End: 2024-08-05 | Stop reason: HOSPADM

## 2024-08-03 RX ORDER — IPRATROPIUM BROMIDE AND ALBUTEROL SULFATE 2.5; .5 MG/3ML; MG/3ML
3 SOLUTION RESPIRATORY (INHALATION) ONCE
Status: DISCONTINUED | OUTPATIENT
Start: 2024-08-03 | End: 2024-08-03 | Stop reason: HOSPADM

## 2024-08-03 RX ORDER — ONDANSETRON 2 MG/ML
4 INJECTION INTRAMUSCULAR; INTRAVENOUS ONCE AS NEEDED
Status: DISCONTINUED | OUTPATIENT
Start: 2024-08-03 | End: 2024-08-03 | Stop reason: HOSPADM

## 2024-08-03 RX ORDER — SODIUM CHLORIDE, SODIUM LACTATE, POTASSIUM CHLORIDE, CALCIUM CHLORIDE 600; 310; 30; 20 MG/100ML; MG/100ML; MG/100ML; MG/100ML
INJECTION, SOLUTION INTRAVENOUS CONTINUOUS PRN
Status: DISCONTINUED | OUTPATIENT
Start: 2024-08-03 | End: 2024-08-03 | Stop reason: SURG

## 2024-08-03 RX ORDER — DEXAMETHASONE SODIUM PHOSPHATE 4 MG/ML
INJECTION, SOLUTION INTRA-ARTICULAR; INTRALESIONAL; INTRAMUSCULAR; INTRAVENOUS; SOFT TISSUE AS NEEDED
Status: DISCONTINUED | OUTPATIENT
Start: 2024-08-03 | End: 2024-08-03 | Stop reason: SURG

## 2024-08-03 RX ORDER — DEXTROSE MONOHYDRATE AND SODIUM CHLORIDE 5; .45 G/100ML; G/100ML
100 INJECTION, SOLUTION INTRAVENOUS CONTINUOUS
Status: DISCONTINUED | OUTPATIENT
Start: 2024-08-03 | End: 2024-08-05 | Stop reason: HOSPADM

## 2024-08-03 RX ORDER — MIDAZOLAM HYDROCHLORIDE 2 MG/2ML
2 INJECTION, SOLUTION INTRAMUSCULAR; INTRAVENOUS ONCE
Status: DISCONTINUED | OUTPATIENT
Start: 2024-08-03 | End: 2024-08-03 | Stop reason: HOSPADM

## 2024-08-03 RX ADMIN — HYDROCODONE BITARTRATE AND ACETAMINOPHEN 1 TABLET: 7.5; 325 TABLET ORAL at 21:45

## 2024-08-03 RX ADMIN — DEXTROSE AND SODIUM CHLORIDE 100 ML/HR: 5; 450 INJECTION, SOLUTION INTRAVENOUS at 17:50

## 2024-08-03 RX ADMIN — PROPOFOL 200 MG: 10 INJECTION, EMULSION INTRAVENOUS at 13:53

## 2024-08-03 RX ADMIN — DEXAMETHASONE SODIUM PHOSPHATE 10 MG: 4 INJECTION, SOLUTION INTRA-ARTICULAR; INTRALESIONAL; INTRAMUSCULAR; INTRAVENOUS; SOFT TISSUE at 13:53

## 2024-08-03 RX ADMIN — HYDROMORPHONE HYDROCHLORIDE 0.5 MG: 1 INJECTION, SOLUTION INTRAMUSCULAR; INTRAVENOUS; SUBCUTANEOUS at 15:46

## 2024-08-03 RX ADMIN — SODIUM CHLORIDE, POTASSIUM CHLORIDE, SODIUM LACTATE AND CALCIUM CHLORIDE 1000 ML: 600; 310; 30; 20 INJECTION, SOLUTION INTRAVENOUS at 10:59

## 2024-08-03 RX ADMIN — MORPHINE SULFATE 4 MG: 4 INJECTION, SOLUTION INTRAMUSCULAR; INTRAVENOUS at 10:42

## 2024-08-03 RX ADMIN — HYDROCODONE BITARTRATE AND ACETAMINOPHEN 1 TABLET: 7.5; 325 TABLET ORAL at 17:50

## 2024-08-03 RX ADMIN — Medication 100 MCG: at 14:07

## 2024-08-03 RX ADMIN — METOCLOPRAMIDE 10 MG: 5 INJECTION, SOLUTION INTRAMUSCULAR; INTRAVENOUS at 13:53

## 2024-08-03 RX ADMIN — SODIUM CHLORIDE, POTASSIUM CHLORIDE, SODIUM LACTATE AND CALCIUM CHLORIDE: 600; 310; 30; 20 INJECTION, SOLUTION INTRAVENOUS at 13:46

## 2024-08-03 RX ADMIN — SODIUM CHLORIDE, POTASSIUM CHLORIDE, SODIUM LACTATE AND CALCIUM CHLORIDE: 600; 310; 30; 20 INJECTION, SOLUTION INTRAVENOUS at 14:47

## 2024-08-03 RX ADMIN — Medication 50 MG: at 14:00

## 2024-08-03 RX ADMIN — IOPAMIDOL 100 ML: 612 INJECTION, SOLUTION INTRAVENOUS at 10:55

## 2024-08-03 RX ADMIN — ONDANSETRON 4 MG: 2 INJECTION INTRAMUSCULAR; INTRAVENOUS at 10:42

## 2024-08-03 RX ADMIN — FENTANYL CITRATE 50 MCG: 50 INJECTION, SOLUTION INTRAMUSCULAR; INTRAVENOUS at 15:04

## 2024-08-03 RX ADMIN — FENTANYL CITRATE 50 MCG: 50 INJECTION, SOLUTION INTRAMUSCULAR; INTRAVENOUS at 15:14

## 2024-08-03 RX ADMIN — MIDAZOLAM HYDROCHLORIDE 2 MG: 1 INJECTION, SOLUTION INTRAMUSCULAR; INTRAVENOUS at 13:45

## 2024-08-03 RX ADMIN — LIDOCAINE HYDROCHLORIDE 100 MG: 20 INJECTION, SOLUTION INTRAVENOUS at 13:53

## 2024-08-03 RX ADMIN — FENTANYL CITRATE 100 MCG: 50 INJECTION, SOLUTION INTRAMUSCULAR; INTRAVENOUS at 13:53

## 2024-08-03 RX ADMIN — ONDANSETRON 4 MG: 2 INJECTION INTRAMUSCULAR; INTRAVENOUS at 13:53

## 2024-08-03 RX ADMIN — METOPROLOL TARTRATE 5 MG: 1 INJECTION, SOLUTION INTRAVENOUS at 15:28

## 2024-08-03 RX ADMIN — Medication 3 ML: at 21:45

## 2024-08-03 RX ADMIN — PIPERACILLIN AND TAZOBACTAM 4.5 G: 4; .5 INJECTION, POWDER, FOR SOLUTION INTRAVENOUS at 12:02

## 2024-08-03 RX ADMIN — Medication 50 MG: at 13:53

## 2024-08-03 RX ADMIN — Medication 200 MG: at 13:53

## 2024-08-03 RX ADMIN — SUGAMMADEX 400 MG: 100 INJECTION, SOLUTION INTRAVENOUS at 15:19

## 2024-08-03 NOTE — PLAN OF CARE
Problem: Adult Inpatient Plan of Care  Goal: Plan of Care Review  Outcome: Ongoing, Progressing  Goal: Patient-Specific Goal (Individualized)  Outcome: Ongoing, Progressing  Goal: Absence of Hospital-Acquired Illness or Injury  Outcome: Ongoing, Progressing  Goal: Optimal Comfort and Wellbeing  Outcome: Ongoing, Progressing  Goal: Readiness for Transition of Care  Outcome: Ongoing, Progressing  Intervention: Mutually Develop Transition Plan   Goal Outcome Evaluation:  Plan of Care Reviewed With: patient

## 2024-08-03 NOTE — H&P
Reason for Consultation: Appendicitis      Chief complaint:  Abdominal pain    SUBJECTIVE:    History of present illness:  I did see the patient in the ER today as a consultation for evaluation and treatment of right sided abdominal pain over the last several days that has progressively worsened in nature.  He did come in for further evaluation today to the ER, he does complain on right lower quadrant pain, severe in nature, worse with movement, not improving, nothing makes this better.  He has never felt like this before.    Review of Systems:    Review of Systems - General ROS: negative for - chills, fatigue, fever, hot flashes, malaise or night sweats  Psychological ROS: negative for - behavioral disorder, disorientation, hallucinations, hostility or mood swings  ENT ROS: negative for - nasal polyps, oral lesions, sinus pain, sneezing or sore throat  Breast ROS: negative for - galactorrhea or new or changing breast lumps  Respiratory ROS: negative for - hemoptysis, orthopnea, pleuritic pain, sputum changes or stridor  Cardiovascular ROS: negative for - dyspnea on exertion, edema, irregular heartbeat, murmur, orthopnea, palpitations or rapid heart rate  Gastrointestinal ROS: negative for - change in stools, gas/bloating, hematemesis, melena or stool incontinence. There is a history of nausea with right lower quadrant pain  Genito-Urinary ROS: negative for - dysuria, genital ulcers, nocturia or pelvic pain  Musculoskeletal ROS: negative for - gait disturbance or muscle pain  Neurological ROS: negative for - dizziness, gait disturbance, memory loss, numbness/tingling or seizures      Allergies:  No Known Allergies    Medications:    Current Facility-Administered Medications:     piperacillin-tazobactam (ZOSYN) IVPB 4.5 g IVPB in 100 mL NS (VTB), 4.5 g, Intravenous, Q6H, Daren Harman MD, Stopped at 08/03/24 1250    sodium chloride 0.9 % flush 10 mL, 10 mL, Intravenous, PRN, Daren Harman MD    Current  Outpatient Medications:     pantoprazole (PROTONIX) 40 MG EC tablet, TAKE 1 TABLET BY MOUTH DAILY 30 MINUTES BEFORE BREAKFAST, Disp: 90 tablet, Rfl: 3    sodium-potassium-magnesium sulfates (Suprep Bowel Prep Kit) 17.5-3.13-1.6 GM/177ML solution oral solution, Take 1 bottle by mouth Take As Directed. Follow instructions that were mailed to your home. If you didn't receive these call [phone]., Disp: 354 mL, Rfl: 0    History:  Past Medical History:   Diagnosis Date    Alvarez esophagus ???    Should be in records    Epigastric pain     Snores        Past Surgical History:   Procedure Laterality Date    COLONOSCOPY  over 10 years ago    ENDOSCOPY N/A 12/12/2018    Procedure: ESOPHAGOGASTRODUODENOSCOPY WITH BIOPSIES;  Surgeon: Guido Hill MD;  Location: [facility] ENDOSCOPY;  Service: Gastroenterology    WISDOM TOOTH EXTRACTION         Family History   Problem Relation Age of Onset    Colon cancer Maternal Grandfather 80       Social History     Tobacco Use    Smoking status: Never    Smokeless tobacco: Never   Substance Use Topics    Alcohol use: Yes     Alcohol/week: 2.0 - 3.0 standard drinks of alcohol     Types: 2 - 3 Cans of beer per week     Comment: OCCASSIONALLY SOCIAL    Drug use: No          OBJECTIVE:    Vital Signs   Temp:  [98.8 °F (37.1 °C)-100.5 °F (38.1 °C)] 100.5 °F (38.1 °C)  Heart Rate:  [112-133] 116  Resp:  [22] 22  BP: (117-161)/() 161/93    Physical Exam:     General Appearance:    Alert, cooperative, complains of right lower quadrant pain   Head:    Normocephalic, without obvious abnormality, atraumatic   Eyes:            Lids and lashes normal, conjunctivae and sclerae normal, no   icterus, no pallor, corneas clear, PERRLA   Ears:    Ears appear intact with no abnormalities noted   Throat:   No oral lesions, no thrush, oral mucosa moist   Neck:   No adenopathy, supple, trachea midline, no thyromegaly, no   carotid bruit, no JVD   Back:     No kyphosis present, no scoliosis present,  no skin lesions,      erythema or scars, no tenderness to percussion or                   palpation,   range of motion normal   Lungs:     Clear to auscultation,respirations regular, even and                  unlabored    Heart:    Regular rhythm and normal rate, normal S1 and S2, no            murmur, no gallop, no rub, no click   Chest Wall:    No abnormalities observed   Abdomen:     Normal bowel sounds, no masses, no organomegaly, soft        tender mostly right lower quadrant non-distended, no guarding, there is evidence of right lower quadrant tenderness   Extremities:   Moves all extremities well, no edema, no cyanosis, no             redness   Pulses:   Pulses palpable and equal bilaterally   Skin:   No bleeding, bruising or rash   Lymph nodes:   No palpable adenopathy   Neurologic:   Cranial nerves 2 - 12 grossly intact, sensation intact, DTR       present and equal bilaterally       Results Review:   I reviewed the patient's new clinical results.  I reviewed the patient's new imaging results and agree with the interpretation.  I reviewed the patient's other test results and agree with the interpretation    I did review the old chart, my interpretation of the CT scan was retrocecal appendicitis possible rupture      ASSESSMENT/PLAN:    Acute appendicitis    I did have a detailed and extensive discussion with the patient in the hospital and they understand that they need to undergo robotic assisted laparoscopic appendectomy or possible open appendectomy. Full risks and benefits of operative versus nonoperative intervention were discussed with the patient and these included things such as nonresolution of symptoms and possible worsening of symptoms without surgical intervention versus infection, bleeding, open appendectomy, postoperative abscess, etc with surgical intervention. The patient understands, agrees, and wishes to proceed with the surgical treatment plan as mentioned above. The patient had no  questions for me at the end of the discussion.      I discussed the patients findings and my recommendations with patient and consulting provider.    I have spent at least 60 minutes thus far on this patient with regard to conversation with ER provider, House Supervisor, nursing staff, Anesthesia, review of old records, interpretation of CT scan, examination and discussion with the patient.        Zeeshan Valdez MD  08/03/24

## 2024-08-03 NOTE — OP NOTE
PATIENT:    Alek Ray    DATE OF SURGERY:    8/3/2024    PHYSICIAN:    Zeeshan Valdez MD    REFERRING PHYSICIAN:  Unknown    YOB: 1975    PREOPERATIVE DIAGNOSIS:  Acute appendicitis    POSTOPERATIVE DIAGNOSIS:  Acute ruptured appendicitis with abscess    PROCEDURE:      1) Robotic assisted laparoscopic appendectomy   2) Drainage of abscess  3) Placement of 10 mm flat JERRY drain in right gutter and suprahepatic space    EBL:  Less than 50 cc    COMPLICATIONS:  None    OPERATIVE PROCEDURE:  The patient was taken to the operating room in the normal manner and given general endotracheal anesthesia.  The patient was also given preoperative IV antibiotics.  I did discuss the situation with the patient preoperatively and I marked them accordingly.  An appropriate timeout was performed intraoperatively prior to the incision.      A left upper quadrant incision was made to insert a Veress needle to insufflate the abdomen with CO2.  A 8 mm Optiview was inserted here and good visualization was obtained. This was later changed to a 12 mm trochar.  A separate left lower quadrant 8 mm Optiview was inserted under direct vision along with the left mid quadrant 8 mm Optiview.      The appendix was found to be in the right lower quadrant and was dissected free. It was retrocecal in nature going to the tip of the liver, there was purulent material in the right gutter and above the liver. It was grasped with graspers without teeth.  The appendiceal mesentery was dissected free and then divided with  a robotic GI stapling device as was the base of the appendix itself.  The appendix was placed in the Endobag and removed via the right upper quadrant 12 mm port site.  Several clips also were applied for further hemostasis.  I did suction the purulent material in order to drain the abscess, gram stain and cultures were sent.    Bovie electrocautery was used very sparingly on the appendiceal mesentery for further  hemostasis.  The abdomen was clean and dry at this point in time. I did then go back to the field and placed a 10 mm JERRY drain after making an incision in the right lower quadrant and inserting this through the abdominal wall and placing it robotically into the right gutter and above the liver.  All trocars were injected with Marcaine for postoperative anesthetic and then removed under direct visualization.  0-Vicryl suture was used to close the fascia and 4-0 subcuticular stitch and Steri-Strips were used to close the skin.  The patient was stable at this point in time and subsequently transferred back to the recovery room in stable condition.    PLAN:  I did have a detailed discussion with Noemí at [phone] with regard to the findings at the time of operative intervention, the patient's current condition, and the postoperative treatment plan.  They had no questions for me at the end of the discussion.  I did discuss the situation with Dr. Kerley and he will need to come in and be admitted overnight.      Zeeshan Valdez MD  8/3/2024  15:21 EDT

## 2024-08-03 NOTE — PLAN OF CARE
Problem: Adult Inpatient Plan of Care  Goal: Plan of Care Review  8/3/2024 1728 by Eryn Perez RN  Outcome: Ongoing, Progressing  8/3/2024 1726 by Eryn Perez RN  Outcome: Ongoing, Progressing  Goal: Patient-Specific Goal (Individualized)  8/3/2024 1728 by Eryn Perez RN  Outcome: Ongoing, Progressing  8/3/2024 1726 by Eryn Perez RN  Outcome: Ongoing, Progressing  Goal: Absence of Hospital-Acquired Illness or Injury  8/3/2024 1728 by Eryn Perez RN  Outcome: Ongoing, Progressing  8/3/2024 1726 by Eryn Perez RN  Outcome: Ongoing, Progressing  Intervention: Identify and Manage Fall Risk  Intervention: Prevent Skin Injury  Intervention: Prevent and Manage VTE (Venous Thromboembolism) Risk  Intervention: Prevent Infection  Goal: Optimal Comfort and Wellbeing  8/3/2024 1728 by Eryn Perez RN  Outcome: Ongoing, Progressing  8/3/2024 1726 by Eryn Perez RN  Outcome: Ongoing, Progressing  Intervention: Provide Person-Centered Care  Goal: Readiness for Transition of Care  8/3/2024 1728 by Eryn Perez RN  Outcome: Ongoing, Progressing  8/3/2024 1726 by Eryn Perez RN  Outcome: Ongoing, Progressing  Intervention: Mutually Develop Transition Plan   Goal Outcome Evaluation:  Plan of Care Reviewed With: patient

## 2024-08-03 NOTE — ANESTHESIA POSTPROCEDURE EVALUATION
Patient: Alek Ray    Procedure Summary       Date: 08/03/24 Room / Location: [facility] OR 2 /  [facility] OR    Anesthesia Start: 1346 Anesthesia Stop: 1532    Procedure: APPENDECTOMY LAPAROSCOPIC WITH DAVINCI ROBOT (Abdomen) Diagnosis:     Surgeons: Zeeshan Valdez MD Provider: Aguilar Ruiz CRNA    Anesthesia Type: general ASA Status: 2 - Emergent            Anesthesia Type: general    Vitals  Vitals Value Taken Time   /68 08/03/24 1600   Temp     Pulse 111 08/03/24 1602   Resp 20 08/03/24 1600   SpO2 92 % 08/03/24 1602   Vitals shown include unfiled device data.          Post Anesthesia Care and Evaluation    Patient location during evaluation: PACU  Patient participation: complete - patient participated  Level of consciousness: awake  Pain score: 3  Pain management: adequate    Airway patency: patent  Anesthetic complications: No anesthetic complications  PONV Status: controlled  Cardiovascular status: acceptable and stable  Respiratory status: acceptable and face mask  Hydration status: acceptable    Comments: See Nursing record for post procedural Vital Signs as per protocol

## 2024-08-03 NOTE — ANESTHESIA PREPROCEDURE EVALUATION
Anesthesia Evaluation     Patient summary reviewed and Nursing notes reviewed   NPO Solid Status: > 8 hours  NPO Liquid Status: > 8 hours           Airway   Mallampati: I  TM distance: >3 FB  Neck ROM: full  No difficulty expected and Possible difficult intubation  Dental - normal exam     Pulmonary - normal exam   (+) ,sleep apnea ( snoring)  Cardiovascular - negative cardio ROS and normal exam  Exercise tolerance: excellent (>7 METS)        Neuro/Psych- negative ROS  GI/Hepatic/Renal/Endo - negative ROS     Musculoskeletal (-) negative ROS    Abdominal  - normal exam    Bowel sounds: normal.   Substance History - negative use     OB/GYN negative ob/gyn ROS         Other                      Anesthesia Plan    ASA 2 - emergent     general     (Risks and benefits discussed including risk of aspiration, recall and dental damage. All patient questions answered.    Patient told that a breathing tube will be used to manage the airway.    Will continue with plan of care.)  intravenous induction     Anesthetic plan, risks, benefits, and alternatives have been provided, discussed and informed consent has been obtained with: patient.    Plan discussed with attending.

## 2024-08-03 NOTE — ED PROVIDER NOTES
[facility] OR  Emergency Department Encounter  Emergency Medicine Physician Note       Pt Name: Alek Ray  MRN: [id number]  Pt :   1975  Room Number:  [facility] OR/MAIN OR  Date of encounter:  8/3/2024  PCP: Provider, No Known  ED Provider: Daren Harman MD    Historian: Patient      HPI:  Chief Complaint: Right-sided abdominal pain        Context: Alek Ray is a 48 y.o. male who presents to the ED for right-sided abdominal pain.  Symptoms ongoing for the past several days.  He notes right-sided flank and abdominal pain with associated nausea and vomiting.  Denies changes to his stools.  No dysuria or hematuria.  Denies known precipitants.  No fevers.  States he is dehydrated.  He reports he has been taking Tylenol and Motrin for symptoms.  He states he had a gallbladder attack several years ago.      PAST MEDICAL HISTORY  Past Medical History:   Diagnosis Date    Alvarez esophagus ???    Should be in records    Epigastric pain     Snores          PAST SURGICAL HISTORY  Past Surgical History:   Procedure Laterality Date    COLONOSCOPY  over 10 years ago    ENDOSCOPY N/A 2018    Procedure: ESOPHAGOGASTRODUODENOSCOPY WITH BIOPSIES;  Surgeon: Guido Hill MD;  Location: [facility] ENDOSCOPY;  Service: Gastroenterology    WISDOM TOOTH EXTRACTION           FAMILY HISTORY  Family History   Problem Relation Age of Onset    Colon cancer Maternal Grandfather 80         SOCIAL HISTORY  Social History     Socioeconomic History    Marital status:    Tobacco Use    Smoking status: Never    Smokeless tobacco: Never   Substance and Sexual Activity    Alcohol use: Yes     Alcohol/week: 2.0 - 3.0 standard drinks of alcohol     Types: 2 - 3 Cans of beer per week     Comment: OCCASSIONALLY SOCIAL    Drug use: No    Sexual activity: Defer         ALLERGIES  Patient has no known allergies.        REVIEW OF SYSTEMS  Systems reviewed and negative      PHYSICAL EXAM    I have  reviewed the triage vital signs and nursing notes.    ED Triage Vitals [08/03/24 0952]   Temp Heart Rate Resp BP SpO2   98.8 °F (37.1 °C) (!) 133 22 152/96 98 %      Temp src Heart Rate Source Patient Position BP Location FiO2 (%)   Oral Monitor Sitting Left arm --       Physical Exam  Cardiovascular:      Rate and Rhythm: Tachycardia present.      Pulses: Normal pulses.   Pulmonary:      Effort: Pulmonary effort is normal. No respiratory distress.   Abdominal:      General: There is no distension.      Palpations: Abdomen is soft.      Tenderness:  in the right upper quadrant and right lower quadrant There is right CVA tenderness. There is no guarding or rebound.   Skin:     General: Skin is warm.   Neurological:      General: No focal deficit present.      Mental Status: He is alert.         LAB RESULTS  Recent Results (from the past 24 hour(s))   Comprehensive Metabolic Panel    Collection Time: 08/03/24 10:14 AM    Specimen: Blood   Result Value Ref Range    Glucose 150 (H) 65 - 99 mg/dL    BUN 19 6 - 20 mg/dL    Creatinine 1.35 (H) 0.76 - 1.27 mg/dL    Sodium 133 (L) 136 - 145 mmol/L    Potassium 3.6 3.5 - 5.2 mmol/L    Chloride 95 (L) 98 - 107 mmol/L    CO2 21.4 (L) 22.0 - 29.0 mmol/L    Calcium 8.9 8.6 - 10.5 mg/dL    Total Protein 7.5 6.0 - 8.5 g/dL    Albumin 4.1 3.5 - 5.2 g/dL    ALT (SGPT) 52 (H) 1 - 41 U/L    AST (SGOT) 38 1 - 40 U/L    Alkaline Phosphatase 95 39 - 117 U/L    Total Bilirubin 1.6 (H) 0.0 - 1.2 mg/dL    Globulin 3.4 gm/dL    A/G Ratio 1.2 g/dL    BUN/Creatinine Ratio 14.1 7.0 - 25.0    Anion Gap 16.6 (H) 5.0 - 15.0 mmol/L    eGFR 65.6 >60.0 mL/min/1.73   Lipase    Collection Time: 08/03/24 10:14 AM    Specimen: Blood   Result Value Ref Range    Lipase 14 13 - 60 U/L   Green Top (Gel)    Collection Time: 08/03/24 10:14 AM   Result Value Ref Range    Extra Tube Hold for add-ons.    Lavender Top    Collection Time: 08/03/24 10:14 AM   Result Value Ref Range    Extra Tube hold for add-on     Gold Top - SST    Collection Time: 08/03/24 10:14 AM   Result Value Ref Range    Extra Tube Hold for add-ons.    Light Blue Top    Collection Time: 08/03/24 10:14 AM   Result Value Ref Range    Extra Tube Hold for add-ons.    CBC Auto Differential    Collection Time: 08/03/24 10:14 AM    Specimen: Blood   Result Value Ref Range    WBC 11.87 (H) 3.40 - 10.80 10*3/mm3    RBC 5.48 4.14 - 5.80 10*6/mm3    Hemoglobin 16.3 13.0 - 17.7 g/dL    Hematocrit 46.2 37.5 - 51.0 %    MCV 84.3 79.0 - 97.0 fL    MCH 29.7 26.6 - 33.0 pg    MCHC 35.3 31.5 - 35.7 g/dL    RDW 12.7 12.3 - 15.4 %    RDW-SD 39.2 37.0 - 54.0 fl    MPV 10.4 6.0 - 12.0 fL    Platelets 168 140 - 450 10*3/mm3    Neutrophil % 83.0 (H) 42.7 - 76.0 %    Lymphocyte % 8.5 (L) 19.6 - 45.3 %    Monocyte % 7.3 5.0 - 12.0 %    Eosinophil % 0.2 (L) 0.3 - 6.2 %    Basophil % 0.5 0.0 - 1.5 %    Immature Grans % 0.5 0.0 - 0.5 %    Neutrophils, Absolute 9.85 (H) 1.70 - 7.00 10*3/mm3    Lymphocytes, Absolute 1.01 0.70 - 3.10 10*3/mm3    Monocytes, Absolute 0.87 0.10 - 0.90 10*3/mm3    Eosinophils, Absolute 0.02 0.00 - 0.40 10*3/mm3    Basophils, Absolute 0.06 0.00 - 0.20 10*3/mm3    Immature Grans, Absolute 0.06 (H) 0.00 - 0.05 10*3/mm3    nRBC 0.0 0.0 - 0.2 /100 WBC   Lactic Acid, Plasma    Collection Time: 08/03/24 10:14 AM    Specimen: Blood   Result Value Ref Range    Lactate 2.3 (C) 0.5 - 2.0 mmol/L   Urinalysis With Microscopic If Indicated (No Culture) - Urine, Clean Catch    Collection Time: 08/03/24 10:59 AM    Specimen: Urine, Clean Catch   Result Value Ref Range    Color, UA Orange (A) Yellow, Straw    Appearance, UA Cloudy (A) Clear    pH, UA 5.5 5.0 - 8.0    Specific Gravity, UA >=1.030 1.005 - 1.030    Glucose, UA Negative Negative    Ketones, UA 15 mg/dL (1+) (A) Negative    Bilirubin, UA Moderate (2+) (A) Negative    Blood, UA Trace (A) Negative    Protein,  mg/dL (2+) (A) Negative    Leuk Esterase, UA Trace (A) Negative    Nitrite, UA Positive (A)  Negative    Urobilinogen, UA 1.0 E.U./dL 0.2 - 1.0 E.U./dL   Urinalysis, Microscopic Only - Urine, Clean Catch    Collection Time: 08/03/24 10:59 AM    Specimen: Urine, Clean Catch   Result Value Ref Range    RBC, UA None Seen None Seen, 0-2 /HPF    WBC, UA 0-2 None Seen, 0-2 /HPF    Bacteria, UA 2+ (A) None Seen /HPF    Squamous Epithelial Cells, UA 7-12 (A) None Seen, 0-2 /HPF    Hyaline Casts, UA 13-20 None Seen /LPF    Methodology Manual Light Microscopy        If labs were ordered, I independently reviewed the results and considered them in treating the patient.        RADIOLOGY  CT Abdomen Pelvis With Contrast    Result Date: 8/3/2024  PROCEDURE: CT ABDOMEN PELVIS W CONTRAST-  HISTORY: RUQ, R flnk pain, eval kidney stone, cholecystitis  COMPARISON: November 5, 2018..  PROCEDURE: The patient was injected with IV contrast. Axial images were obtained from the lung bases to the pubic symphysis by computed tomography. This study was performed with techniques to keep radiation doses as low as reasonably achievable, (ALARA). Individualized dose reduction techniques using automated exposure control or adjustment of mA and/or kV according to the patient size were employed.  FINDINGS:  ABDOMEN: The lung bases are clear. The heart is proper size. The liver demonstrates diffuse fatty infiltration and is enlarged up to 21 cm without focal mass. Gallbladder is present and contains at least one gallstone. No wall thickening identified. There is minimal fluid adjacent to the inferior tip of the right lobe of the liver extending into the right lower quadrant. The spleen is mildly enlarged without focal abnormality. No adrenal mass is present.  The pancreas is unremarkable. The kidneys are unremarkable, without evidence of mass or hydronephrosis. The aorta is proper caliber.     PELVIS: The GI tract demonstrates air-fluid levels in nondistended small bowel, consider ileus. The appendix is retrocecal and extends into the right  upper quadrant adjacent to the inferior tip of the right lobe of the liver best seen series 3 image 48. Appendix is dilated up to 15 mm and contains an appendicolith proximally. It contains air but there is also extraluminal air anteriorly best seen series 2 image 47 consistent with rupture. There is infiltration of the surrounding fat. Small mesenteric lymph nodes are seen medial to the inflammatory process. No free fluid identified in the pelvis. The urinary bladder is mostly collapsed. Prostate is normal in size. There is no free fluid, adenopathy, or inflammatory process.      Acute appendicitis of a retrocecal appendix containing an appendicalith and extending up to the medial aspect of the inferior right lobe of the liver with small amount of free fluid and extraluminal air consistent with rupture.  Dr. Harman notified in the ER at 11:10am August 3, 2024.  Cholelithiasis.  Enlarged, fatty infiltrated liver with mild splenomegaly; recommend correlation with liver function tests.  CTDI: 16.31 mGy DLP:1047.06 mGy.cm  This report was signed and finalized on 8/3/2024 11:13 AM by Dora Rivera MD.       PROCEDURES    Procedures    ECG 12 Lead Tachycardia   Final Result          MEDICATIONS GIVEN IN ER    Medications   sodium chloride 0.9 % flush 10 mL ( Intravenous MAR Hold 8/3/24 1348)   piperacillin-tazobactam (ZOSYN) IVPB 4.5 g IVPB in 100 mL NS (VTB) (0 g Intravenous Stopped 8/3/24 1250)   lactated ringers bolus 1,000 mL (0 mL Intravenous Stopped 8/3/24 1325)   morphine injection 4 mg (4 mg Intravenous Given 8/3/24 1042)   ondansetron (ZOFRAN) injection 4 mg (4 mg Intravenous Given 8/3/24 1042)   iopamidol (ISOVUE-300) 61 % injection 100 mL (100 mL Intravenous Given 8/3/24 1055)         MEDICAL DECISION MAKING, PROGRESS, and CONSULTS    All labs, if obtained, have been independently reviewed by me.  All radiology studies, if obtained, have been reviewed by me and the radiologist dictating the report.  All EKG's,  if obtained, have been independently viewed and interpreted by me.      Discussion below represents my analysis of pertinent findings related to patient's condition, differential diagnosis, treatment plan and final disposition.                         Differential diagnosis:    Kidney stone, cholecystitis, appendicitis, RK, others.      Additional sources:    - Discussed/ obtained information from independent historians:      - External (non-ED) record review: Outpatient progress note 7/19/2023    - Chronic or social conditions impacting care:      - Shared decision making:        Orders placed during this visit:  Orders Placed This Encounter   Procedures    Blood Culture - Blood,    Blood Culture - Blood,    CT Abdomen Pelvis With Contrast    [place] Draw    Comprehensive Metabolic Panel    Lipase    Urinalysis With Microscopic If Indicated (No Culture) - Urine, Clean Catch    CBC Auto Differential    Urinalysis, Microscopic Only - Urine, Clean Catch    Lactic Acid, Plasma    STAT Lactic Acid, Reflex    NPO Diet NPO Type: Strict NPO    Undress & Gown    Place Sequential Compression Device    Maintain Sequential Compression Device    ECG 12 Lead Tachycardia    Insert Peripheral IV    CBC & Differential    Green Top (Gel)    Lavender Top    Gold Top - SST    Light Blue Top         Additional orders considered but not ordered:      ED Course/MDM Discussion:    Patient is a 48-year-old male presenting with right-sided abdominal pain.  Symptoms ongoing for the past several days.  He arrives tachycardic with otherwise stable vital signs.  Labs demonstrate a mild leukocytosis of 11.87.  Creatinine is 1.35.  EKG demonstrated sinus tachycardia with right bundle branch block morphology and rate related ST-T changes on my interpretation.  CT imaging obtained shows evidence for extraluminal air on my interpretation of imaging.  Radiology interpretation notes acute appendicitis with perforation.  Patient is NPO.  He is  receiving IV fluids.  Zosyn ordered for antibiotic coverage. He meets SIRS criteria and lactic/blood cultures were ordered. Discussed with Dr. Valdez for consideration of surgical intervention and continued management. Patient taken to OR.                Consultants:    Surgery Dr. Valdez        AS OF 14:15 EDT VITALS:    BP - 161/93  HR - 116  TEMP - 100.5 °F (38.1 °C) (Oral)  O2 SATS - 94%                  DIAGNOSIS  Final diagnoses:   Acute appendicitis, unspecified acute appendicitis type         DISPOSITION  ED Disposition       ED Disposition   Send to OR    Condition   --    Comment   --                   Please note that portions of this document were completed with voice recognition software.        Daren Harman MD  08/03/24 2855

## 2024-08-03 NOTE — CONSULTS
"    [facility] HOSPITALIST   CONSULTATION      Name:  Alek Ray   Age:  48 y.o.  Sex:  male  :  1975  MRN:  [id number]   Visit Number:  [id number]  Admission Date:  8/3/2024  Date Of Service:  24  Primary Care Physician:  Provider, No Known    Consulting Physician:    Aniceto Nye DO    Referring Physician:    Dr. Zeeshan Valdez    Reason For Consult:    Sepsis    Chief Complaint:     Abdominal pain    History Of Presenting Illness:      Patient is a 48-year-old male brought to the emergency department for complaints of right-sided abdominal pain.  It has been ongoing for several days.  Patient reports associated nausea and vomiting with the abdominal pain since onset.  Patient reports that he has taken Tylenol and Motrin without any improvement.  Reports feeling similar quality to \"gallbladder attack\" several years ago.  Abdominal pain gets worse with movement.    In the emergency department, significant laboratory data revealed creatinine 1.35, glucose 150, lactic acid 2.3, white count 11.8.  CT abdomen pelvis with contrast showed acute appendicitis with small amount of free fluid and extraluminal air consistent with ruptured.  General surgery, Dr. Valdez, was consulted from the ED.  Patient was taken to the OR for emergent appendectomy with drainage of abscess.  10 mm JERRY drain was placed in the right lower quadrant.  Patient tolerated the surgical intervention well without any immediate postoperative complications.  Hospitalist service was consulted for medical management of sepsis, chronic medical conditions, and transfer of care starting 2024.    Review Of Systems:     All systems were reviewed and negative except for: Summarized in HPI    Past Medical History: Patient  has a past medical history of Alvarez esophagus (???), Epigastric pain, and Snores.    Past Surgical History: Patient  has a past surgical history that includes Colonoscopy (over 10 years ago); " [place] tooth extraction; and Esophagogastroduodenoscopy (N/A, 12/12/2018).    Social History: Patient  reports that he has never smoked. He has never used smokeless tobacco. He reports current alcohol use of about 2.0 - 3.0 standard drinks of alcohol per week. He reports that he does not use drugs.    Family History: Patient's family history has been reviewed and found to be noncontributory.     Allergies:      Patient has no known allergies.    Home Medications:    Prior to Admission Medications       Prescriptions Last Dose Informant Patient Reported? Taking?    pantoprazole (PROTONIX) 40 MG EC tablet   No No    TAKE 1 TABLET BY MOUTH DAILY 30 MINUTES BEFORE BREAKFAST    sodium-potassium-magnesium sulfates (Suprep Bowel Prep Kit) 17.5-3.13-1.6 GM/177ML solution oral solution   No No    Take 1 bottle by mouth Take As Directed. Follow instructions that were mailed to your home. If you didn't receive these call [phone].             Medication Review:     I have reviewed the patient's active and prn medications.      Vital Signs:    Temp:  [98.8 °F (37.1 °C)-100.5 °F (38.1 °C)] 100.5 °F (38.1 °C)  Heart Rate:  [112-133] 116  Resp:  [22] 22  BP: (117-161)/() 161/93        08/03/24  0952   Weight: 127 kg (280 lb)       Body mass index is 37.97 kg/m².    Physical Exam:     General Appearance:  Alert and cooperative.  Ill-appearing   Head:  Atraumatic and normocephalic.   Eyes: Conjunctivae and sclerae normal, no icterus. No pallor.   Ears:  Ears with no abnormalities noted.   Throat: No oral lesions, no thrush, oral mucosa moist.   Neck: Supple, trachea midline, no thyromegaly.   Back:   No kyphoscoliosis present. No tenderness to palpation.   Lungs:   Breath sounds heard bilaterally equally.  No crackles or wheezing. No pleural rub or bronchial breathing.   Heart:  Normal S1 and S2, no murmur, no gallop, no rub. No JVD.   Abdomen:   Hypoactive bowel sounds, no masses, no organomegaly. Soft, tenderness to  palpation diffusely throughout, worse on the right than the left.,  Mild distention noted, no rebound tenderness.  No guarding present.  No surgical abdomen at this time.  Anterior abdominal incision bandages clean/dry/intact.  JERRY bulb noted in the right lower quadrant.   Extremities: Supple, no edema, no cyanosis, no clubbing.   Pulses: Pulses palpable bilaterally.   Skin: No bleeding or rash.   Neurologic: Alert and oriented x 3. No facial asymmetry. Moves all four limbs. No tremors.      Laboratory data:    Results from last 7 days   Lab Units 08/03/24  1014   SODIUM mmol/L 133*   POTASSIUM mmol/L 3.6   CHLORIDE mmol/L 95*   CO2 mmol/L 21.4*   BUN mg/dL 19   CREATININE mg/dL 1.35*   CALCIUM mg/dL 8.9   BILIRUBIN mg/dL 1.6*   ALK PHOS U/L 95   ALT (SGPT) U/L 52*   AST (SGOT) U/L 38   GLUCOSE mg/dL 150*     Results from last 7 days   Lab Units 08/03/24  1014   WBC 10*3/mm3 11.87*   HEMOGLOBIN g/dL 16.3   HEMATOCRIT % 46.2   PLATELETS 10*3/mm3 168                     Results from last 7 days   Lab Units 08/03/24  1014   LIPASE U/L 14         Results from last 7 days   Lab Units 08/03/24  1059   COLOR UA  [place]*   GLUCOSE UA  Negative   KETONES UA  15 mg/dL (1+)*   BLOOD UA  Trace*   LEUKOCYTES UA  Trace*   PH, URINE  5.5   BILIRUBIN UA  Moderate (2+)*   UROBILINOGEN UA  1.0 E.U./dL     Pain Management Panel           No data to display                      Radiology:    CT Abdomen Pelvis With Contrast    Result Date: 8/3/2024  PROCEDURE: CT ABDOMEN PELVIS W CONTRAST-  HISTORY: RUQ, R flnk pain, eval kidney stone, cholecystitis  COMPARISON: November 5, 2018..  PROCEDURE: The patient was injected with IV contrast. Axial images were obtained from the lung bases to the pubic symphysis by computed tomography. This study was performed with techniques to keep radiation doses as low as reasonably achievable, (ALARA). Individualized dose reduction techniques using automated exposure control or adjustment of mA and/or kV  according to the patient size were employed.  FINDINGS:  ABDOMEN: The lung bases are clear. The heart is proper size. The liver demonstrates diffuse fatty infiltration and is enlarged up to 21 cm without focal mass. Gallbladder is present and contains at least one gallstone. No wall thickening identified. There is minimal fluid adjacent to the inferior tip of the right lobe of the liver extending into the right lower quadrant. The spleen is mildly enlarged without focal abnormality. No adrenal mass is present.  The pancreas is unremarkable. The kidneys are unremarkable, without evidence of mass or hydronephrosis. The aorta is proper caliber.     PELVIS: The GI tract demonstrates air-fluid levels in nondistended small bowel, consider ileus. The appendix is retrocecal and extends into the right upper quadrant adjacent to the inferior tip of the right lobe of the liver best seen series 3 image 48. Appendix is dilated up to 15 mm and contains an appendicolith proximally. It contains air but there is also extraluminal air anteriorly best seen series 2 image 47 consistent with rupture. There is infiltration of the surrounding fat. Small mesenteric lymph nodes are seen medial to the inflammatory process. No free fluid identified in the pelvis. The urinary bladder is mostly collapsed. Prostate is normal in size. There is no free fluid, adenopathy, or inflammatory process.      Acute appendicitis of a retrocecal appendix containing an appendicalith and extending up to the medial aspect of the inferior right lobe of the liver with small amount of free fluid and extraluminal air consistent with rupture.  Dr. Harman notified in the ER at 11:10am August 3, 2024.  Cholelithiasis.  Enlarged, fatty infiltrated liver with mild splenomegaly; recommend correlation with liver function tests.  CTDI: 16.31 mGy DLP:1047.06 mGy.cm  This report was signed and finalized on 8/3/2024 11:13 AM by Dora Rivera MD.       Assessment:     Severe  sepsis secondary to ruptured appendicitis  Appendicitis  Lactic acidosis  Acute renal insufficiency  Hyperglycemia    Recommendations/Plan:     Severe sepsis secondary to ruptured appendicitis  Appendicitis  Lactic acidosis  Patient meets sepsis criteria with leukocytosis, tachycardia.  Source of infection ruptured appendix.  Initial lactic acid greater than 2, acute renal insufficiency present.  IV Zosyn.  IV antibiotics.  Cultures sent off intraoperatively, will await results.  POD#0 RA Appendectomy with abscess drainage.  Infection control has been reasonably achieved.  Pain control per general surgery.  Liquid diet  Right lower quadrant JERRY drain present, 60 mL out postoperatively, which was disposed of.  By the time of exam, another 10 mL present.  Serosanguineous in nature, does not appear to be any purulent material.  Acute renal insufficiency  IV fluids.  Avoid nephrotoxic agents as appropriate.  If no improvement next 24 to 48 hours, consider nephrology consultation.  Hyperglycemia  Check A1c.    Thank you for this consult. Please do not hesitate to call me for any questions.    Aniceto Nye DO  08/03/24  15:40 EDT    Dictated utilizing Dragon dictation.

## 2024-08-03 NOTE — ANESTHESIA PROCEDURE NOTES
Airway  Urgency: elective    Date/Time: 8/3/2024 1:54 PM  Airway not difficult    General Information and Staff    Patient location during procedure: OR  CRNA/CAA: Aguilar Ruiz CRNA    Indications and Patient Condition  Indications for airway management: airway protection    Preoxygenated: yes  Mask difficulty assessment: 1 - vent by mask    Final Airway Details  Final airway type: endotracheal airway      Successful airway: ETT  Cuffed: yes   Successful intubation technique: direct laryngoscopy  Facilitating devices/methods: intubating stylet  Endotracheal tube insertion site: oral  Blade: Maribel  Blade size: 4  ETT size (mm): 7.5  Cormack-Lehane Classification: grade I - full view of glottis  Placement verified by: chest auscultation and capnometry   Measured from: lips  ETT/EBT  to lips (cm): 21  Number of attempts at approach: 1  Assessment: lips, teeth, and gum same as pre-op and atraumatic intubation    Additional Comments  Dentition and Lips as preoperative assessment. Airway placed without complication. ETT cuff inflated to minimal occlusive pressure.

## 2024-08-04 LAB
ANION GAP SERPL CALCULATED.3IONS-SCNC: 10.6 MMOL/L (ref 5–15)
BACTERIA BLD CULT: ABNORMAL
BASOPHILS # BLD AUTO: 0.02 10*3/MM3 (ref 0–0.2)
BASOPHILS NFR BLD AUTO: 0.2 % (ref 0–1.5)
BOTTLE TYPE: ABNORMAL
BUN SERPL-MCNC: 12 MG/DL (ref 6–20)
BUN/CREAT SERPL: 16.2 (ref 7–25)
CALCIUM SPEC-SCNC: 8.6 MG/DL (ref 8.6–10.5)
CHLORIDE SERPL-SCNC: 100 MMOL/L (ref 98–107)
CO2 SERPL-SCNC: 22.4 MMOL/L (ref 22–29)
CREAT SERPL-MCNC: 0.74 MG/DL (ref 0.76–1.27)
DEPRECATED RDW RBC AUTO: 39.8 FL (ref 37–54)
EGFRCR SERPLBLD CKD-EPI 2021: 113.2 ML/MIN/1.73
EOSINOPHIL # BLD AUTO: 0 10*3/MM3 (ref 0–0.4)
EOSINOPHIL NFR BLD AUTO: 0 % (ref 0.3–6.2)
ERYTHROCYTE [DISTWIDTH] IN BLOOD BY AUTOMATED COUNT: 12.8 % (ref 12.3–15.4)
GLUCOSE SERPL-MCNC: 174 MG/DL (ref 65–99)
HCT VFR BLD AUTO: 38.5 % (ref 37.5–51)
HGB BLD-MCNC: 13.6 G/DL (ref 13–17.7)
IMM GRANULOCYTES # BLD AUTO: 0.06 10*3/MM3 (ref 0–0.05)
IMM GRANULOCYTES NFR BLD AUTO: 0.5 % (ref 0–0.5)
LYMPHOCYTES # BLD AUTO: 0.81 10*3/MM3 (ref 0.7–3.1)
LYMPHOCYTES NFR BLD AUTO: 6.2 % (ref 19.6–45.3)
MAGNESIUM SERPL-MCNC: 2.1 MG/DL (ref 1.6–2.6)
MCH RBC QN AUTO: 30.2 PG (ref 26.6–33)
MCHC RBC AUTO-ENTMCNC: 35.3 G/DL (ref 31.5–35.7)
MCV RBC AUTO: 85.4 FL (ref 79–97)
MONOCYTES # BLD AUTO: 0.55 10*3/MM3 (ref 0.1–0.9)
MONOCYTES NFR BLD AUTO: 4.2 % (ref 5–12)
NEUTROPHILS NFR BLD AUTO: 11.61 10*3/MM3 (ref 1.7–7)
NEUTROPHILS NFR BLD AUTO: 88.9 % (ref 42.7–76)
NRBC BLD AUTO-RTO: 0 /100 WBC (ref 0–0.2)
PHOSPHATE SERPL-MCNC: 1.6 MG/DL (ref 2.5–4.5)
PHOSPHATE SERPL-MCNC: 2.1 MG/DL (ref 2.5–4.5)
PLATELET # BLD AUTO: 156 10*3/MM3 (ref 140–450)
PMV BLD AUTO: 10.7 FL (ref 6–12)
POTASSIUM SERPL-SCNC: 4.6 MMOL/L (ref 3.5–5.2)
RBC # BLD AUTO: 4.51 10*6/MM3 (ref 4.14–5.8)
SODIUM SERPL-SCNC: 133 MMOL/L (ref 136–145)
WBC NRBC COR # BLD AUTO: 13.05 10*3/MM3 (ref 3.4–10.8)

## 2024-08-04 PROCEDURE — 83735 ASSAY OF MAGNESIUM: CPT | Performed by: FAMILY MEDICINE

## 2024-08-04 PROCEDURE — 99232 SBSQ HOSP IP/OBS MODERATE 35: CPT | Performed by: FAMILY MEDICINE

## 2024-08-04 PROCEDURE — 25010000002 PIPERACILLIN SOD-TAZOBACTAM PER 1 G: Performed by: FAMILY MEDICINE

## 2024-08-04 PROCEDURE — 25810000003 SODIUM CHLORIDE 0.9 % SOLUTION 250 ML FLEX CONT: Performed by: FAMILY MEDICINE

## 2024-08-04 PROCEDURE — 84100 ASSAY OF PHOSPHORUS: CPT | Performed by: FAMILY MEDICINE

## 2024-08-04 PROCEDURE — 85025 COMPLETE CBC W/AUTO DIFF WBC: CPT | Performed by: FAMILY MEDICINE

## 2024-08-04 PROCEDURE — 80048 BASIC METABOLIC PNL TOTAL CA: CPT | Performed by: FAMILY MEDICINE

## 2024-08-04 PROCEDURE — 87040 BLOOD CULTURE FOR BACTERIA: CPT | Performed by: FAMILY MEDICINE

## 2024-08-04 PROCEDURE — 99024 POSTOP FOLLOW-UP VISIT: CPT | Performed by: SURGERY

## 2024-08-04 RX ADMIN — SODIUM PHOSPHATE, MONOBASIC, MONOHYDRATE AND SODIUM PHOSPHATE, DIBASIC, ANHYDROUS 15 MMOL: 142; 276 INJECTION, SOLUTION INTRAVENOUS at 08:46

## 2024-08-04 RX ADMIN — Medication 3 ML: at 08:46

## 2024-08-04 RX ADMIN — SODIUM PHOSPHATE, MONOBASIC, MONOHYDRATE AND SODIUM PHOSPHATE, DIBASIC, ANHYDROUS 15 MMOL: 142; 276 INJECTION, SOLUTION INTRAVENOUS at 13:09

## 2024-08-04 RX ADMIN — PIPERACILLIN AND TAZOBACTAM 4.5 G: 4; .5 INJECTION, POWDER, FOR SOLUTION INTRAVENOUS at 08:42

## 2024-08-04 RX ADMIN — HYDROCODONE BITARTRATE AND ACETAMINOPHEN 1 TABLET: 7.5; 325 TABLET ORAL at 05:14

## 2024-08-04 RX ADMIN — DEXTROSE AND SODIUM CHLORIDE 100 ML/HR: 5; 450 INJECTION, SOLUTION INTRAVENOUS at 03:46

## 2024-08-04 RX ADMIN — Medication 3 ML: at 21:38

## 2024-08-04 RX ADMIN — PIPERACILLIN AND TAZOBACTAM 4.5 G: 4; .5 INJECTION, POWDER, FOR SOLUTION INTRAVENOUS at 14:26

## 2024-08-04 RX ADMIN — HYDROCODONE BITARTRATE AND ACETAMINOPHEN 1 TABLET: 7.5; 325 TABLET ORAL at 15:00

## 2024-08-04 RX ADMIN — PIPERACILLIN AND TAZOBACTAM 4.5 G: 4; .5 INJECTION, POWDER, FOR SOLUTION INTRAVENOUS at 21:38

## 2024-08-04 NOTE — PROGRESS NOTES
LOS: 0 days   Patient Care Team:  Provider, No Known as PCP - General      Chief Complaint: Patient states he feels markedly better      Interval History:     Patient Complaints: See Above, passing flatus    History taken from: patient RN    Vital Signs  Temp:  [97.7 °F (36.5 °C)-100.5 °F (38.1 °C)] 97.7 °F (36.5 °C)  Heart Rate:  [105-118] 110  Resp:  [16-20] 18  BP: (101-161)/(59-93) 123/78    Physical Exam:     General Appearance:    Alert, cooperative, in no acute distress   Head:    Normocephalic, without obvious abnormality, atraumatic   Lungs:     Clear to auscultation,respirations regular, even and                  unlabored    Heart:    Regular rhythm and normal rate, normal S1 and S2, no            murmur, no gallop, no rub, no click   Abdomen:     Normal bowel sounds, no masses, no organomegaly, soft        non-tender, non-distended, no guarding, no rebound                tenderness   Extremities:   Moves all extremities well, no edema, no cyanosis, no             redness   Pulses:   Pulses palpable and equal bilaterally   Skin:   No bleeding, bruising or rash        Results Review:       Lab Results (last 24 hours)       Procedure Component Value Units Date/Time    Body Fluid Culture - Surgical Site, Abdominal Wall [[id number]]  (Abnormal) Collected: 08/03/24 1432    Specimen: Surgical Site from Abdominal Wall Updated: 08/04/24 1015     Body Fluid Culture Moderate growth (3+) Gram Negative Bacilli     Gram Stain Moderate (3+) WBCs seen      Moderate (3+) Gram negative bacilli      Rare (1+) Gram negative coccobacilli    Blood Culture - Blood, Hand, Left [[id number]] Collected: 08/04/24 0800    Specimen: Blood from Hand, Left Updated: 08/04/24 0824    Blood Culture - Blood, Arm, Right [[id number]] Collected: 08/04/24 0809    Specimen: Blood from Arm, Right Updated: 08/04/24 0824    Blood Culture ID, PCR - Blood, Arm, Right [[id number]]  (Abnormal) Collected: 08/03/24 1202    Specimen: Blood from Arm,  Right Updated: 08/04/24 0735     BCID, PCR Escherichia coli. Identification by BCID2 PCR.     BOTTLE TYPE Anaerobic Bottle    Narrative:      No resistance genes detected.    Blood Culture - Blood, Arm, Right [[id number]]  (Abnormal) Collected: 08/03/24 1202    Specimen: Blood from Arm, Right Updated: 08/04/24 0734     Blood Culture Abnormal Stain     Gram Stain Anaerobic Bottle Gram negative bacilli    Narrative:      Less than seven (7) mL's of blood was collected.  Insufficient quantity may yield false negative results.    Phosphorus [[id number]]  (Abnormal) Collected: 08/04/24 0615    Specimen: Blood Updated: 08/04/24 0709     Phosphorus 1.6 mg/dL     Magnesium [[id number]]  (Normal) Collected: 08/04/24 0615    Specimen: Blood Updated: 08/04/24 0655     Magnesium 2.1 mg/dL     Basic Metabolic Panel [[id number]]  (Abnormal) Collected: 08/04/24 0615    Specimen: Blood Updated: 08/04/24 0655     Glucose 174 mg/dL      BUN 12 mg/dL      Creatinine 0.74 mg/dL      Sodium 133 mmol/L      Potassium 4.6 mmol/L      Chloride 100 mmol/L      CO2 22.4 mmol/L      Calcium 8.6 mg/dL      BUN/Creatinine Ratio 16.2     Anion Gap 10.6 mmol/L      eGFR 113.2 mL/min/1.73     Narrative:      GFR Normal >60  Chronic Kidney Disease <60  Kidney Failure <15      CBC & Differential [[id number]]  (Abnormal) Collected: 08/04/24 0615    Specimen: Blood Updated: 08/04/24 0640    Narrative:      The following orders were created for panel order CBC & Differential.  Procedure                               Abnormality         Status                     ---------                               -----------         ------                     CBC Auto Differential[[id number]]        Abnormal            Final result                 Please view results for these tests on the individual orders.    CBC Auto Differential [[id number]]  (Abnormal) Collected: 08/04/24 0615    Specimen: Blood Updated: 08/04/24 0640     WBC 13.05 10*3/mm3      RBC 4.51 10*6/mm3   "    Hemoglobin 13.6 g/dL      Hematocrit 38.5 %      MCV 85.4 fL      MCH 30.2 pg      MCHC 35.3 g/dL      RDW 12.8 %      RDW-SD 39.8 fl      MPV 10.7 fL      Platelets 156 10*3/mm3      Neutrophil % 88.9 %      Lymphocyte % 6.2 %      Monocyte % 4.2 %      Eosinophil % 0.0 %      Basophil % 0.2 %      Immature Grans % 0.5 %      Neutrophils, Absolute 11.61 10*3/mm3      Lymphocytes, Absolute 0.81 10*3/mm3      Monocytes, Absolute 0.55 10*3/mm3      Eosinophils, Absolute 0.00 10*3/mm3      Basophils, Absolute 0.02 10*3/mm3      Immature Grans, Absolute 0.06 10*3/mm3      nRBC 0.0 /100 WBC     STAT Lactic Acid, Reflex [[id number]]  (Normal) Collected: 08/03/24 1750    Specimen: Blood Updated: 08/03/24 1825     Lactate 1.7 mmol/L     Procalcitonin [[id number]]  (Abnormal) Collected: 08/03/24 1609    Specimen: Blood Updated: 08/03/24 1712     Procalcitonin 16.94 ng/mL     Narrative:      As a Marker for Sepsis (Non-Neonates):    1. <0.5 ng/mL represents a low risk of severe sepsis and/or septic shock.  2. >2 ng/mL represents a high risk of severe sepsis and/or septic shock.    As a Marker for Lower Respiratory Tract Infections that require antibiotic therapy:    PCT on Admission    Antibiotic Therapy       6-12 Hrs later    >0.5                Strongly Recommended  >0.25 - <0.5        Recommended   0.1 - 0.25          Discouraged              Remeasure/reassess PCT  <0.1                Strongly Discouraged     Remeasure/reassess PCT    As 28 day mortality risk marker: \"Change in Procalcitonin Result\" (>80% or <=80%) if Day 0 (or Day 1) and Day 4 values are available. Refer to http://www.[facility]-pct-calculator.com    Change in PCT <=80%  A decrease of PCT levels below or equal to 80% defines a positive change in PCT test result representing a higher risk for 28-day all-cause mortality of patients diagnosed with severe sepsis for septic shock.    Change in PCT >80%  A decrease of PCT levels of more than 80% defines a " negative change in PCT result representing a lower risk for 28-day all-cause mortality of patients diagnosed with severe sepsis or septic shock.       Basic Metabolic Panel [[id number]]  (Abnormal) Collected: 08/03/24 1609    Specimen: Blood Updated: 08/03/24 1646     Glucose 168 mg/dL      BUN 17 mg/dL      Creatinine 1.04 mg/dL      Sodium 131 mmol/L      Potassium 4.6 mmol/L      Chloride 97 mmol/L      CO2 20.9 mmol/L      Calcium 8.2 mg/dL      BUN/Creatinine Ratio 16.3     Anion Gap 13.1 mmol/L      eGFR 89.7 mL/min/1.73     Narrative:      GFR Normal >60  Chronic Kidney Disease <60  Kidney Failure <15      Magnesium [[id number]]  (Normal) Collected: 08/03/24 1609    Specimen: Blood Updated: 08/03/24 1646     Magnesium 1.7 mg/dL     CBC & Differential [[id number]]  (Abnormal) Collected: 08/03/24 1609    Specimen: Blood Updated: 08/03/24 1643    Narrative:      The following orders were created for panel order CBC & Differential.  Procedure                               Abnormality         Status                     ---------                               -----------         ------                     CBC Auto Differential[[id number]]        Abnormal            Final result               Scan Slide[[id number]]                                                                    Please view results for these tests on the individual orders.    CBC Auto Differential [[id number]]  (Abnormal) Collected: 08/03/24 1609    Specimen: Blood Updated: 08/03/24 1643     WBC 13.65 10*3/mm3      RBC 4.96 10*6/mm3      Hemoglobin 14.8 g/dL      Hematocrit 42.6 %      MCV 85.9 fL      MCH 29.8 pg      MCHC 34.7 g/dL      RDW 13.0 %      RDW-SD 40.7 fl      MPV 10.3 fL      Platelets 147 10*3/mm3      Neutrophil % 91.0 %      Lymphocyte % 3.7 %      Monocyte % 3.3 %      Eosinophil % 1.2 %      Basophil % 0.4 %      Immature Grans % 0.4 %      Neutrophils, Absolute 12.41 10*3/mm3      Lymphocytes, Absolute 0.51 10*3/mm3      Monocytes,  Absolute 0.45 10*3/mm3      Eosinophils, Absolute 0.17 10*3/mm3      Basophils, Absolute 0.05 10*3/mm3      Immature Grans, Absolute 0.06 10*3/mm3      nRBC 0.0 /100 WBC     Phosphorus [[id number]]  (Abnormal) Collected: 08/03/24 1609    Specimen: Blood Updated: 08/03/24 1642     Phosphorus 2.4 mg/dL     Hemoglobin A1c [[id number]]  (Normal) Collected: 08/03/24 1609    Specimen: Blood Updated: 08/03/24 1631     Hemoglobin A1C 5.20 %     Narrative:      Hemoglobin A1C Ranges:    Increased Risk for Diabetes  5.7% to 6.4%  Diabetes                     >= 6.5%  Diabetic Goal                < 7.0%    Anaerobic Culture - Surgical Site, Abdominal Wall [[id number]] Collected: 08/03/24 1432    Specimen: Surgical Site from Abdominal Wall Updated: 08/03/24 1540    Blood Culture - Blood, Hand, Left [[id number]] Collected: 08/03/24 1203    Specimen: Blood from Hand, Left Updated: 08/03/24 1203                Assessment & Plan       Ruptured appendicitis      JERRY output was noted and is minimal, is serosanguineous in nature.  Laboratory values were noted, blood cultures were actually positive for E. coli, Gram stain at the time of surgical intervention showed many gram-negative bacilli.  I did have a detailed and extensive discussion with the patient and the nurse today in the room and also discussed the situation with the hospitalist.  I think the patient needs to stay another day in the hospital for continued IV antibiotic coverage especially since his blood cultures were positive.  He did have gangrenous retrocecal appendicitis with abscess that needed drainage and drain placement, he understands and agrees, he had no questions for me at the end of the discussion.      Zeeshan Valdez MD  08/04/24  11:49 EDT

## 2024-08-04 NOTE — PROGRESS NOTES
"    [facility] HOSPITALIST    PROGRESS NOTE    Name:  Alek Ray   Age:  48 y.o.  Sex:  male  :  1975  MRN:  [id number]   Visit Number:  [id number]  Admission Date:  8/3/2024  Date Of Service:  24  Primary Care Physician:  Provider, No Known     LOS: 0 days :    Chief Complaint:      Abdominal pain    Subjective:    Patient seen and evaluated.  Progressing well, tolerating p.o. intake of clears.  Urinating without difficulty.  Ambulated to and from the bathroom appropriately.  Serosanguineous discharge still noted through the JERRY drain, has been emptied reportedly 3-4 times.  Abdominal distention improved, abdomen less tender.  Incisions clean/dry/intact.    Hospital Course:    Patient is a 48-year-old male brought to the emergency department for complaints of right-sided abdominal pain.  It has been ongoing for several days.  Patient reports associated nausea and vomiting with the abdominal pain since onset.  Patient reports that he has taken Tylenol and Motrin without any improvement.  Reports feeling similar quality to \"gallbladder attack\" several years ago.  Abdominal pain gets worse with movement.     In the emergency department, significant laboratory data revealed creatinine 1.35, glucose 150, lactic acid 2.3, white count 11.8.  CT abdomen pelvis with contrast showed acute appendicitis with small amount of free fluid and extraluminal air consistent with ruptured.  General surgery, Dr. Valdez, was consulted from the ED.  Patient was taken to the OR for emergent appendectomy with drainage of abscess.  10 mm JERRY drain was placed in the right lower quadrant.  Patient tolerated the surgical intervention well without any immediate postoperative complications.  Hospitalist service was consulted for medical management of sepsis, chronic medical conditions, and transfer of care starting 2024.    Review of Systems:     All systems were reviewed and negative except as " mentioned in subjective, assessment and plan.    Vital Signs:    Temp:  [97.7 °F (36.5 °C)-100.5 °F (38.1 °C)] 97.7 °F (36.5 °C)  Heart Rate:  [105-118] 110  Resp:  [16-20] 18  BP: (101-161)/(59-93) 123/78    Intake and output:    I/O last 3 completed shifts:  In: 2793.3 [I.V.:2793.3]  Out: 2135 [Urine:1900; Drains:220; Blood:15]  No intake/output data recorded.    Physical Examination:    General Appearance:  Alert and cooperative.  No acute distress.   Head:  Atraumatic and normocephalic.   Eyes: Conjunctivae and sclerae normal, no icterus. No pallor.   Throat: No oral lesions, no thrush, oral mucosa moist.   Neck: Supple, trachea midline, no thyromegaly.   Lungs:   Breath sounds heard bilaterally equally.  No wheezing or crackles. No Pleural rub or bronchial breathing.   Heart:  Normal S1 and S2, no murmur, no gallop, no rub. No JVD.   Abdomen:   Normal bowel sounds in all quadrants, no masses, no organomegaly. Soft, improved tenderness localized to right lower quadrant, nondistended, no rebound tenderness.  No acute abdomen at this time.  Incision bandages clean/dry/intact.  JERRY bulb noted in the right lower quadrant   Extremities: Supple, no edema, no cyanosis, no clubbing.   Skin: No bleeding or rash.   Neurologic: Alert and oriented x 3. No facial asymmetry. Moves all four limbs. No tremors.      Laboratory results:    Results from last 7 days   Lab Units 08/04/24  0615 08/03/24  1609 08/03/24  1014   SODIUM mmol/L 133* 131* 133*   POTASSIUM mmol/L 4.6 4.6 3.6   CHLORIDE mmol/L 100 97* 95*   CO2 mmol/L 22.4 20.9* 21.4*   BUN mg/dL 12 17 19   CREATININE mg/dL 0.74* 1.04 1.35*   CALCIUM mg/dL 8.6 8.2* 8.9   BILIRUBIN mg/dL  --   --  1.6*   ALK PHOS U/L  --   --  95   ALT (SGPT) U/L  --   --  52*   AST (SGOT) U/L  --   --  38   GLUCOSE mg/dL 174* 168* 150*     Results from last 7 days   Lab Units 08/04/24  0615 08/03/24  1609 08/03/24  1014   WBC 10*3/mm3 13.05* 13.65* 11.87*   HEMOGLOBIN g/dL 13.6 14.8 16.3  "  HEMATOCRIT % 38.5 42.6 46.2   PLATELETS 10*3/mm3 156 147 168             Results from last 7 days   Lab Units 08/03/24  1202   BLOODCX  Abnormal Stain*     No results for input(s): \"PHART\", \"NWL4OXP\", \"PO2ART\", \"UCS7DTI\", \"BASEEXCESS\" in the last 8760 hours.   I have reviewed the patient's laboratory results.    Radiology results:    CT Abdomen Pelvis With Contrast    Result Date: 8/3/2024  PROCEDURE: CT ABDOMEN PELVIS W CONTRAST-  HISTORY: RUQ, R flnk pain, eval kidney stone, cholecystitis  COMPARISON: November 5, 2018..  PROCEDURE: The patient was injected with IV contrast. Axial images were obtained from the lung bases to the pubic symphysis by computed tomography. This study was performed with techniques to keep radiation doses as low as reasonably achievable, (ALARA). Individualized dose reduction techniques using automated exposure control or adjustment of mA and/or kV according to the patient size were employed.  FINDINGS:  ABDOMEN: The lung bases are clear. The heart is proper size. The liver demonstrates diffuse fatty infiltration and is enlarged up to 21 cm without focal mass. Gallbladder is present and contains at least one gallstone. No wall thickening identified. There is minimal fluid adjacent to the inferior tip of the right lobe of the liver extending into the right lower quadrant. The spleen is mildly enlarged without focal abnormality. No adrenal mass is present.  The pancreas is unremarkable. The kidneys are unremarkable, without evidence of mass or hydronephrosis. The aorta is proper caliber.     PELVIS: The GI tract demonstrates air-fluid levels in nondistended small bowel, consider ileus. The appendix is retrocecal and extends into the right upper quadrant adjacent to the inferior tip of the right lobe of the liver best seen series 3 image 48. Appendix is dilated up to 15 mm and contains an appendicolith proximally. It contains air but there is also extraluminal air anteriorly best seen " series 2 image 47 consistent with rupture. There is infiltration of the surrounding fat. Small mesenteric lymph nodes are seen medial to the inflammatory process. No free fluid identified in the pelvis. The urinary bladder is mostly collapsed. Prostate is normal in size. There is no free fluid, adenopathy, or inflammatory process.      Impression: Acute appendicitis of a retrocecal appendix containing an appendicalith and extending up to the medial aspect of the inferior right lobe of the liver with small amount of free fluid and extraluminal air consistent with rupture.  Dr. Harman notified in the ER at 11:10am August 3, 2024.  Cholelithiasis.  Enlarged, fatty infiltrated liver with mild splenomegaly; recommend correlation with liver function tests.  CTDI: 16.31 mGy DLP:1047.06 mGy.cm  This report was signed and finalized on 8/3/2024 11:13 AM by Dora Rivera MD.     I have reviewed the patient's radiology reports.    Medication Review:     I have reviewed the patient's active and prn medications.     Problem List:      Ruptured appendicitis      Assessment:    Severe sepsis secondary to ruptured appendicitis  Appendicitis  E Coli Bacteremia  Lactic acidosis  Acute renal insufficiency  Hyperglycemia    Plan:    Severe sepsis secondary to ruptured appendicitis  Appendicitis  E Coli bacteremia  Lactic acidosis  Patient meets sepsis criteria with leukocytosis, tachycardia.  Source of infection ruptured appendix.  Initial lactic acid greater than 2, acute renal insufficiency present.  IV Zosyn.  IV antibiotics.  Cultures sent off intraoperatively, will await results.  POD#1 RA Appendectomy with abscess drainage.  Infection control has been reasonably achieved.  Pain control per general surgery.  Advancing diet  Right lower quadrant JERRY drain present.  Final sensitivities pending, consider changing to PO option if amenable.   Acute renal insufficiency  IV fluids.  Avoid nephrotoxic agents as appropriate.  If no  improvement next 24 to 48 hours, consider nephrology consultation.  Hyperglycemia  Check A1c.    DVT Prophylaxis: SCDs  Code Status: Full code  Diet: Soft, advance as tolerated  Discharge Plan: Home in 2 to 3 days, pending final culture sensitivities.    Aniceto Nye DO  08/04/24  11:38 EDT    Dictated utilizing Dragon dictation.

## 2024-08-04 NOTE — PROGRESS NOTES
Patient: Alek Lockett Cory    @A@    Anesthesia Type: general  Patient location: [facility] Surgical Floor  Last vitals  BP      Temp      Pulse     Resp      SpO2        Post vital signs: stable  Level of consciousness: awake, alert, and oriented    Post-anesthesia pain: adequate analgesia  Airway patency: patent  Respiratory: unassisted  Cardiovascular: stable and blood pressure at baseline  Hydration: euvolemic    Difficult Airway: no  Anesthetic complications: no

## 2024-08-04 NOTE — CASE MANAGEMENT/SOCIAL WORK
Discharge Planning Assessment  [facility]     Patient Name: Alek Ray  MRN: [id number]  Today's Date: 8/4/2024    Admit Date: 8/3/2024        Discharge Needs Assessment       Row Name 08/04/24 1205       Living Environment    People in Home child(ximena), dependent;spouse    Current Living Arrangements home    Potentially Unsafe Housing Conditions none    Primary Care Provided by self    Provides Primary Care For child(ximena)    Family Caregiver if Needed spouse    Quality of Family Relationships involved    Able to Return to Prior Arrangements yes       Resource/Environmental Concerns    Resource/Environmental Concerns none    Transportation Concerns none       Transportation Needs    In the past 12 months, has lack of transportation kept you from medical appointments or from getting medications? no    In the past 12 months, has lack of transportation kept you from meetings, work, or from getting things needed for daily living? No       Food Insecurity    Within the past 12 months, you worried that your food would run out before you got the money to buy more. Never true    Within the past 12 months, the food you bought just didn't last and you didn't have money to get more. Never true       Transition Planning    Patient/Family Anticipates Transition to home with family    Patient/Family Anticipated Services at Transition none    Transportation Anticipated car, drives self;family or friend will provide       Discharge Needs Assessment    Readmission Within the Last 30 Days no previous admission in last 30 days    Equipment Currently Used at Home none    Concerns to be Addressed no discharge needs identified    Anticipated Changes Related to Illness none    Equipment Needed After Discharge none    Provided Post Acute Provider List? N/A    Provided Post Acute Provider Quality & Resource List? N/A                   Discharge Plan       Row Name 08/04/24 0594       Plan    Plan Comments Confirmed address and  phone number as being correct in face sheet  At this time does not remember his primary Provider name  Has not seen him since last year .Lives with his wife and 5 children .Denies any needs at this time                  Continued Care and Services - Admitted Since 8/3/2024    No active coordination exists for this encounter.          Demographic Summary       Row Name 08/04/24 1206       General Information    Admission Type inpatient    Arrived From emergency department    Referral Source admission list    Reason for Consult discharge planning    Preferred Language English                   Functional Status       Row Name 08/04/24 1205       Functional Status    Usual Activity Tolerance good       Physical Activity    On average, how many days per week do you engage in moderate to strenuous exercise (like a brisk walk)? Pt Declined    On average, how many minutes do you engage in exercise at this level? Pt Declined       Functional Status, IADL    Medications independent    Meal Preparation independent    Housekeeping independent    Laundry independent    Shopping independent       Mental Status    General Appearance WDL WDL                   Psychosocial    No documentation.                  Abuse/Neglect    No documentation.                  Legal    No documentation.                  Substance Abuse    No documentation.                  Patient Forms    No documentation.                     Chiquita Brink RN

## 2024-08-04 NOTE — PLAN OF CARE
Goal Outcome Evaluation:      PRN meds given for pain. Pt ambulated the hallway this shift. Diet advanced to GI soft, tolerating well. 50ml out of JERRY drain this shift. Discharge pending for tomorrow      Problem: Adult Inpatient Plan of Care  Goal: Plan of Care Review  Outcome: Ongoing, Progressing  Goal: Patient-Specific Goal (Individualized)  Outcome: Ongoing, Progressing  Goal: Absence of Hospital-Acquired Illness or Injury  Outcome: Ongoing, Progressing  Intervention: Identify and Manage Fall Risk  Recent Flowsheet Documentation  Taken 8/4/2024 1800 by Yazmin Osborne LPN  Safety Promotion/Fall Prevention:   safety round/check completed   room organization consistent  Taken 8/4/2024 1600 by Yazmin Osborne LPN  Safety Promotion/Fall Prevention:   safety round/check completed   room organization consistent  Taken 8/4/2024 1400 by Yazmin Osborne LPN  Safety Promotion/Fall Prevention:   safety round/check completed   room organization consistent  Taken 8/4/2024 1200 by Yazmin Osborne LPN  Safety Promotion/Fall Prevention:   safety round/check completed   room organization consistent  Taken 8/4/2024 1000 by Yazmin Osborne LPN  Safety Promotion/Fall Prevention:   safety round/check completed   room organization consistent  Taken 8/4/2024 0842 by Yazmin Osborne LPN  Safety Promotion/Fall Prevention:   safety round/check completed   room organization consistent   clutter free environment maintained   assistive device/personal items within reach  Intervention: Prevent Skin Injury  Recent Flowsheet Documentation  Taken 8/4/2024 1800 by Yazmin Osborne LPN  Body Position: sitting up in bed  Taken 8/4/2024 1600 by Yazmin Osborne LPN  Body Position: sitting up in bed  Taken 8/4/2024 1400 by Yazmin Osborne LPN  Body Position: sitting up in bed  Taken 8/4/2024 1200 by Yazmin Osborne LPN  Body Position: sitting up in bed  Taken 8/4/2024 1000 by Yazmin Osborne LPN  Body Position: sitting  up in bed  Taken 8/4/2024 0842 by Yazmin Osborne LPN  Body Position: sitting up in bed  Intervention: Prevent and Manage VTE (Venous Thromboembolism) Risk  Recent Flowsheet Documentation  Taken 8/4/2024 1800 by Yazmin Osbonre LPN  Activity Management: up ad christy  Taken 8/4/2024 1600 by Yazmin Osborne LPN  Activity Management: up ad christy  Taken 8/4/2024 1400 by Yazmin Osborne LPN  Activity Management: up ad christy  Taken 8/4/2024 1200 by Yazmin Osborne LPN  Activity Management: up ad christy  Taken 8/4/2024 1000 by Yazmin Osborne LPN  Activity Management: activity encouraged  Taken 8/4/2024 0842 by Yazmin Osborne LPN  Activity Management: activity encouraged  VTE Prevention/Management: sequential compression devices on  Range of Motion: active ROM (range of motion) encouraged  Goal: Optimal Comfort and Wellbeing  Outcome: Ongoing, Progressing  Intervention: Provide Person-Centered Care  Recent Flowsheet Documentation  Taken 8/4/2024 0842 by Yazmin Osborne LPN  Trust Relationship/Rapport:   care explained   choices provided  Goal: Readiness for Transition of Care  Outcome: Ongoing, Progressing     Problem: Adjustment to Illness (Sepsis/Septic Shock)  Goal: Optimal Coping  Outcome: Ongoing, Progressing  Intervention: Optimize Psychosocial Adjustment to Illness  Recent Flowsheet Documentation  Taken 8/4/2024 0842 by Yazmin Osborne LPN  Family/Support System Care:   support provided   self-care encouraged     Problem: Bleeding (Sepsis/Septic Shock)  Goal: Absence of Bleeding  Outcome: Ongoing, Progressing     Problem: Glycemic Control Impaired (Sepsis/Septic Shock)  Goal: Blood Glucose Level Within Desired Range  Outcome: Ongoing, Progressing     Problem: Infection Progression (Sepsis/Septic Shock)  Goal: Absence of Infection Signs and Symptoms  Outcome: Ongoing, Progressing  Intervention: Promote Recovery  Recent Flowsheet Documentation  Taken 8/4/2024 1800 by Yazmin Osborne LPN  Activity  Management: up ad christy  Taken 8/4/2024 1600 by Yazmin Osborne LPN  Activity Management: up ad christy  Taken 8/4/2024 1400 by Yazmin Osborne LPN  Activity Management: up ad christy  Taken 8/4/2024 1200 by Yazmin Osborne LPN  Activity Management: up ad christy  Taken 8/4/2024 1000 by Yazmin Osborne LPN  Activity Management: activity encouraged  Taken 8/4/2024 0842 by Yazmin Osborne LPN  Activity Management: activity encouraged     Problem: Nutrition Impaired (Sepsis/Septic Shock)  Goal: Optimal Nutrition Intake  Outcome: Ongoing, Progressing

## 2024-08-04 NOTE — PAYOR COMM NOTE
"To:  [facility]  From: Miracle Ernst RN  Phone: [phone]  Fax: [phone]  NPI: [id number]  TIN: [id number]  Member ID: [id number]   MRN: [id number]    Alek Adams (48 y.o. Male)       Date of Birth   1975    Social Security Number       Address   [address] [place] [address]    Home Phone   [phone]    MRN   [id number]       Confucianist   None    Marital Status                               Admission Date   8/3/24    Admission Type   Emergency    Admitting Provider   Aniceto Nye DO    Attending Provider   Aniceto Nye DO    Department, Room/Bed   [facility] TELEMETRY 3, 323/1       Discharge Date       Discharge Disposition       Discharge Destination                                 Attending Provider: Aniceto Nye DO    Allergies: No Known Allergies    Isolation: None   Infection: None   Code Status: CPR    Ht: 182.9 cm (72\")   Wt: 121 kg (267 lb 3.2 oz)    Admission Cmt: None   Principal Problem: Ruptured appendicitis [K35.32]                   Active Insurance as of 8/3/2024       Primary Coverage       Payor Plan Insurance Group Employer/Plan Group    [employer] BLUE CROSS [employer] BLUE CROSS BLUE [employer]IELD PPO [id number]       Payor Plan Address Payor Plan Phone Number Payor Plan Fax Number Effective Dates    [address] [phone]  1/1/2018 - None Entered    [place] [address]         Subscriber Name Subscriber Birth Date Member ID       ALEK ADAMS 1975 [id number]                     Emergency Contacts        (Rel.) Home Phone Work Phone Mobile Phone    CoryBrea (Spouse) [phone] -- --    Gabi Fernandez (Relative) [phone] -- --                 History & Physical        Zeeshan Valdez MD at 08/03/24 1332              Reason for Consultation: Appendicitis      Chief complaint:  Abdominal pain    SUBJECTIVE:    History of present illness:  I did see the patient in the ER today as a consultation for " evaluation and treatment of right sided abdominal pain over the last several days that has progressively worsened in nature.  He did come in for further evaluation today to the ER, he does complain on right lower quadrant pain, severe in nature, worse with movement, not improving, nothing makes this better.  He has never felt like this before.    Review of Systems:    Review of Systems - General ROS: negative for - chills, fatigue, fever, hot flashes, malaise or night sweats  Psychological ROS: negative for - behavioral disorder, disorientation, hallucinations, hostility or mood swings  ENT ROS: negative for - nasal polyps, oral lesions, sinus pain, sneezing or sore throat  Breast ROS: negative for - galactorrhea or new or changing breast lumps  Respiratory ROS: negative for - hemoptysis, orthopnea, pleuritic pain, sputum changes or stridor  Cardiovascular ROS: negative for - dyspnea on exertion, edema, irregular heartbeat, murmur, orthopnea, palpitations or rapid heart rate  Gastrointestinal ROS: negative for - change in stools, gas/bloating, hematemesis, melena or stool incontinence. There is a history of nausea with right lower quadrant pain  Genito-Urinary ROS: negative for - dysuria, genital ulcers, nocturia or pelvic pain  Musculoskeletal ROS: negative for - gait disturbance or muscle pain  Neurological ROS: negative for - dizziness, gait disturbance, memory loss, numbness/tingling or seizures      Allergies:  No Known Allergies    Medications:    Current Facility-Administered Medications:     piperacillin-tazobactam (ZOSYN) IVPB 4.5 g IVPB in 100 mL NS (VTB), 4.5 g, Intravenous, Q6H, Daren Harman MD, Stopped at 08/03/24 1250    sodium chloride 0.9 % flush 10 mL, 10 mL, Intravenous, PRN, Daren Harman MD    Current Outpatient Medications:     pantoprazole (PROTONIX) 40 MG EC tablet, TAKE 1 TABLET BY MOUTH DAILY 30 MINUTES BEFORE BREAKFAST, Disp: 90 tablet, Rfl: 3    sodium-potassium-magnesium sulfates  (Suprep Bowel Prep Kit) 17.5-3.13-1.6 GM/177ML solution oral solution, Take 1 bottle by mouth Take As Directed. Follow instructions that were mailed to your home. If you didn't receive these call [phone]., Disp: 354 mL, Rfl: 0    History:  Past Medical History:   Diagnosis Date    Alvarez esophagus ???    Should be in records    Epigastric pain     Snores        Past Surgical History:   Procedure Laterality Date    COLONOSCOPY  over 10 years ago    ENDOSCOPY N/A 12/12/2018    Procedure: ESOPHAGOGASTRODUODENOSCOPY WITH BIOPSIES;  Surgeon: Guido Hill MD;  Location: [facility] ENDOSCOPY;  Service: Gastroenterology    WISDOM TOOTH EXTRACTION         Family History   Problem Relation Age of Onset    Colon cancer Maternal Grandfather 80       Social History     Tobacco Use    Smoking status: Never    Smokeless tobacco: Never   Substance Use Topics    Alcohol use: Yes     Alcohol/week: 2.0 - 3.0 standard drinks of alcohol     Types: 2 - 3 Cans of beer per week     Comment: OCCASSIONALLY SOCIAL    Drug use: No          OBJECTIVE:    Vital Signs   Temp:  [98.8 °F (37.1 °C)-100.5 °F (38.1 °C)] 100.5 °F (38.1 °C)  Heart Rate:  [112-133] 116  Resp:  [22] 22  BP: (117-161)/() 161/93    Physical Exam:     General Appearance:    Alert, cooperative, complains of right lower quadrant pain   Head:    Normocephalic, without obvious abnormality, atraumatic   Eyes:            Lids and lashes normal, conjunctivae and sclerae normal, no   icterus, no pallor, corneas clear, PERRLA   Ears:    Ears appear intact with no abnormalities noted   Throat:   No oral lesions, no thrush, oral mucosa moist   Neck:   No adenopathy, supple, trachea midline, no thyromegaly, no   carotid bruit, no JVD   Back:     No kyphosis present, no scoliosis present, no skin lesions,      erythema or scars, no tenderness to percussion or                   palpation,   range of motion normal   Lungs:     Clear to auscultation,respirations regular, even  and                  unlabored    Heart:    Regular rhythm and normal rate, normal S1 and S2, no            murmur, no gallop, no rub, no click   Chest Wall:    No abnormalities observed   Abdomen:     Normal bowel sounds, no masses, no organomegaly, soft        tender mostly right lower quadrant non-distended, no guarding, there is evidence of right lower quadrant tenderness   Extremities:   Moves all extremities well, no edema, no cyanosis, no             redness   Pulses:   Pulses palpable and equal bilaterally   Skin:   No bleeding, bruising or rash   Lymph nodes:   No palpable adenopathy   Neurologic:   Cranial nerves 2 - 12 grossly intact, sensation intact, DTR       present and equal bilaterally       Results Review:   I reviewed the patient's new clinical results.  I reviewed the patient's new imaging results and agree with the interpretation.  I reviewed the patient's other test results and agree with the interpretation    I did review the old chart, my interpretation of the CT scan was retrocecal appendicitis possible rupture      ASSESSMENT/PLAN:    Acute appendicitis    I did have a detailed and extensive discussion with the patient in the hospital and they understand that they need to undergo robotic assisted laparoscopic appendectomy or possible open appendectomy. Full risks and benefits of operative versus nonoperative intervention were discussed with the patient and these included things such as nonresolution of symptoms and possible worsening of symptoms without surgical intervention versus infection, bleeding, open appendectomy, postoperative abscess, etc with surgical intervention. The patient understands, agrees, and wishes to proceed with the surgical treatment plan as mentioned above. The patient had no questions for me at the end of the discussion.      I discussed the patients findings and my recommendations with patient and consulting provider.    I have spent at least 60 minutes thus far on  this patient with regard to conversation with ER provider, House Supervisor, nursing staff, Anesthesia, review of old records, interpretation of CT scan, examination and discussion with the patient.        Zeeshan Valdez MD  24              Electronically signed by Zeeshan Valdez MD at 24 1336          Emergency Department Notes        Daren Harman MD at 24 1021                   [facility] OR  Emergency Department Encounter  Emergency Medicine Physician Note       Pt Name: Alek Ray  MRN: [id number]  Pt :   1975  Room Number:  [facility] OR/MAIN OR  Date of encounter:  8/3/2024  PCP: Provider, No Known  ED Provider: Daren Harman MD    Historian: Patient      HPI:  Chief Complaint: Right-sided abdominal pain        Context: Alek Ray is a 48 y.o. male who presents to the ED for right-sided abdominal pain.  Symptoms ongoing for the past several days.  He notes right-sided flank and abdominal pain with associated nausea and vomiting.  Denies changes to his stools.  No dysuria or hematuria.  Denies known precipitants.  No fevers.  States he is dehydrated.  He reports he has been taking Tylenol and Motrin for symptoms.  He states he had a gallbladder attack several years ago.      PAST MEDICAL HISTORY  Past Medical History:   Diagnosis Date    Alvarez esophagus ???    Should be in records    Epigastric pain     Snores          PAST SURGICAL HISTORY  Past Surgical History:   Procedure Laterality Date    COLONOSCOPY  over 10 years ago    ENDOSCOPY N/A 2018    Procedure: ESOPHAGOGASTRODUODENOSCOPY WITH BIOPSIES;  Surgeon: Guido Hill MD;  Location: [facility] ENDOSCOPY;  Service: Gastroenterology    WISDOM TOOTH EXTRACTION           FAMILY HISTORY  Family History   Problem Relation Age of Onset    Colon cancer Maternal Grandfather 80         SOCIAL HISTORY  Social History     Socioeconomic History    Marital status:    Tobacco Use    Smoking  status: Never    Smokeless tobacco: Never   Substance and Sexual Activity    Alcohol use: Yes     Alcohol/week: 2.0 - 3.0 standard drinks of alcohol     Types: 2 - 3 Cans of beer per week     Comment: OCCASSIONALLY SOCIAL    Drug use: No    Sexual activity: Defer         ALLERGIES  Patient has no known allergies.        REVIEW OF SYSTEMS  Systems reviewed and negative      PHYSICAL EXAM    I have reviewed the triage vital signs and nursing notes.    ED Triage Vitals [08/03/24 0952]   Temp Heart Rate Resp BP SpO2   98.8 °F (37.1 °C) (!) 133 22 152/96 98 %      Temp src Heart Rate Source Patient Position BP Location FiO2 (%)   Oral Monitor Sitting Left arm --       Physical Exam  Cardiovascular:      Rate and Rhythm: Tachycardia present.      Pulses: Normal pulses.   Pulmonary:      Effort: Pulmonary effort is normal. No respiratory distress.   Abdominal:      General: There is no distension.      Palpations: Abdomen is soft.      Tenderness:  in the right upper quadrant and right lower quadrant There is right CVA tenderness. There is no guarding or rebound.   Skin:     General: Skin is warm.   Neurological:      General: No focal deficit present.      Mental Status: He is alert.         LAB RESULTS  Recent Results (from the past 24 hour(s))   Comprehensive Metabolic Panel    Collection Time: 08/03/24 10:14 AM    Specimen: Blood   Result Value Ref Range    Glucose 150 (H) 65 - 99 mg/dL    BUN 19 6 - 20 mg/dL    Creatinine 1.35 (H) 0.76 - 1.27 mg/dL    Sodium 133 (L) 136 - 145 mmol/L    Potassium 3.6 3.5 - 5.2 mmol/L    Chloride 95 (L) 98 - 107 mmol/L    CO2 21.4 (L) 22.0 - 29.0 mmol/L    Calcium 8.9 8.6 - 10.5 mg/dL    Total Protein 7.5 6.0 - 8.5 g/dL    Albumin 4.1 3.5 - 5.2 g/dL    ALT (SGPT) 52 (H) 1 - 41 U/L    AST (SGOT) 38 1 - 40 U/L    Alkaline Phosphatase 95 39 - 117 U/L    Total Bilirubin 1.6 (H) 0.0 - 1.2 mg/dL    Globulin 3.4 gm/dL    A/G Ratio 1.2 g/dL    BUN/Creatinine Ratio 14.1 7.0 - 25.0    Anion Gap  16.6 (H) 5.0 - 15.0 mmol/L    eGFR 65.6 >60.0 mL/min/1.73   Lipase    Collection Time: 08/03/24 10:14 AM    Specimen: Blood   Result Value Ref Range    Lipase 14 13 - 60 U/L   Green Top (Gel)    Collection Time: 08/03/24 10:14 AM   Result Value Ref Range    Extra Tube Hold for add-ons.    Lavender Top    Collection Time: 08/03/24 10:14 AM   Result Value Ref Range    Extra Tube hold for add-on    Gold Top - SST    Collection Time: 08/03/24 10:14 AM   Result Value Ref Range    Extra Tube Hold for add-ons.    Light Blue Top    Collection Time: 08/03/24 10:14 AM   Result Value Ref Range    Extra Tube Hold for add-ons.    CBC Auto Differential    Collection Time: 08/03/24 10:14 AM    Specimen: Blood   Result Value Ref Range    WBC 11.87 (H) 3.40 - 10.80 10*3/mm3    RBC 5.48 4.14 - 5.80 10*6/mm3    Hemoglobin 16.3 13.0 - 17.7 g/dL    Hematocrit 46.2 37.5 - 51.0 %    MCV 84.3 79.0 - 97.0 fL    MCH 29.7 26.6 - 33.0 pg    MCHC 35.3 31.5 - 35.7 g/dL    RDW 12.7 12.3 - 15.4 %    RDW-SD 39.2 37.0 - 54.0 fl    MPV 10.4 6.0 - 12.0 fL    Platelets 168 140 - 450 10*3/mm3    Neutrophil % 83.0 (H) 42.7 - 76.0 %    Lymphocyte % 8.5 (L) 19.6 - 45.3 %    Monocyte % 7.3 5.0 - 12.0 %    Eosinophil % 0.2 (L) 0.3 - 6.2 %    Basophil % 0.5 0.0 - 1.5 %    Immature Grans % 0.5 0.0 - 0.5 %    Neutrophils, Absolute 9.85 (H) 1.70 - 7.00 10*3/mm3    Lymphocytes, Absolute 1.01 0.70 - 3.10 10*3/mm3    Monocytes, Absolute 0.87 0.10 - 0.90 10*3/mm3    Eosinophils, Absolute 0.02 0.00 - 0.40 10*3/mm3    Basophils, Absolute 0.06 0.00 - 0.20 10*3/mm3    Immature Grans, Absolute 0.06 (H) 0.00 - 0.05 10*3/mm3    nRBC 0.0 0.0 - 0.2 /100 WBC   Lactic Acid, Plasma    Collection Time: 08/03/24 10:14 AM    Specimen: Blood   Result Value Ref Range    Lactate 2.3 (C) 0.5 - 2.0 mmol/L   Urinalysis With Microscopic If Indicated (No Culture) - Urine, Clean Catch    Collection Time: 08/03/24 10:59 AM    Specimen: Urine, Clean Catch   Result Value Ref Range    Color,  UA Orange (A) Yellow, Straw    Appearance, UA Cloudy (A) Clear    pH, UA 5.5 5.0 - 8.0    Specific Gravity, UA >=1.030 1.005 - 1.030    Glucose, UA Negative Negative    Ketones, UA 15 mg/dL (1+) (A) Negative    Bilirubin, UA Moderate (2+) (A) Negative    Blood, UA Trace (A) Negative    Protein,  mg/dL (2+) (A) Negative    Leuk Esterase, UA Trace (A) Negative    Nitrite, UA Positive (A) Negative    Urobilinogen, UA 1.0 E.U./dL 0.2 - 1.0 E.U./dL   Urinalysis, Microscopic Only - Urine, Clean Catch    Collection Time: 08/03/24 10:59 AM    Specimen: Urine, Clean Catch   Result Value Ref Range    RBC, UA None Seen None Seen, 0-2 /HPF    WBC, UA 0-2 None Seen, 0-2 /HPF    Bacteria, UA 2+ (A) None Seen /HPF    Squamous Epithelial Cells, UA 7-12 (A) None Seen, 0-2 /HPF    Hyaline Casts, UA 13-20 None Seen /LPF    Methodology Manual Light Microscopy        If labs were ordered, I independently reviewed the results and considered them in treating the patient.        RADIOLOGY  CT Abdomen Pelvis With Contrast    Result Date: 8/3/2024  PROCEDURE: CT ABDOMEN PELVIS W CONTRAST-  HISTORY: RUQ, R flnk pain, eval kidney stone, cholecystitis  COMPARISON: November 5, 2018..  PROCEDURE: The patient was injected with IV contrast. Axial images were obtained from the lung bases to the pubic symphysis by computed tomography. This study was performed with techniques to keep radiation doses as low as reasonably achievable, (ALARA). Individualized dose reduction techniques using automated exposure control or adjustment of mA and/or kV according to the patient size were employed.  FINDINGS:  ABDOMEN: The lung bases are clear. The heart is proper size. The liver demonstrates diffuse fatty infiltration and is enlarged up to 21 cm without focal mass. Gallbladder is present and contains at least one gallstone. No wall thickening identified. There is minimal fluid adjacent to the inferior tip of the right lobe of the liver extending into the  right lower quadrant. The spleen is mildly enlarged without focal abnormality. No adrenal mass is present.  The pancreas is unremarkable. The kidneys are unremarkable, without evidence of mass or hydronephrosis. The aorta is proper caliber.     PELVIS: The GI tract demonstrates air-fluid levels in nondistended small bowel, consider ileus. The appendix is retrocecal and extends into the right upper quadrant adjacent to the inferior tip of the right lobe of the liver best seen series 3 image 48. Appendix is dilated up to 15 mm and contains an appendicolith proximally. It contains air but there is also extraluminal air anteriorly best seen series 2 image 47 consistent with rupture. There is infiltration of the surrounding fat. Small mesenteric lymph nodes are seen medial to the inflammatory process. No free fluid identified in the pelvis. The urinary bladder is mostly collapsed. Prostate is normal in size. There is no free fluid, adenopathy, or inflammatory process.      Acute appendicitis of a retrocecal appendix containing an appendicalith and extending up to the medial aspect of the inferior right lobe of the liver with small amount of free fluid and extraluminal air consistent with rupture.  Dr. Harman notified in the ER at 11:10am August 3, 2024.  Cholelithiasis.  Enlarged, fatty infiltrated liver with mild splenomegaly; recommend correlation with liver function tests.  CTDI: 16.31 mGy DLP:1047.06 mGy.cm  This report was signed and finalized on 8/3/2024 11:13 AM by Dora Rivera MD.       PROCEDURES    Procedures    ECG 12 Lead Tachycardia   Final Result          MEDICATIONS GIVEN IN ER    Medications   sodium chloride 0.9 % flush 10 mL ( Intravenous MAR Hold 8/3/24 1348)   piperacillin-tazobactam (ZOSYN) IVPB 4.5 g IVPB in 100 mL NS (VTB) (0 g Intravenous Stopped 8/3/24 1250)   lactated ringers bolus 1,000 mL (0 mL Intravenous Stopped 8/3/24 1325)   morphine injection 4 mg (4 mg Intravenous Given 8/3/24 1042)    ondansetron (ZOFRAN) injection 4 mg (4 mg Intravenous Given 8/3/24 1042)   iopamidol (ISOVUE-300) 61 % injection 100 mL (100 mL Intravenous Given 8/3/24 1055)         MEDICAL DECISION MAKING, PROGRESS, and CONSULTS    All labs, if obtained, have been independently reviewed by me.  All radiology studies, if obtained, have been reviewed by me and the radiologist dictating the report.  All EKG's, if obtained, have been independently viewed and interpreted by me.      Discussion below represents my analysis of pertinent findings related to patient's condition, differential diagnosis, treatment plan and final disposition.                         Differential diagnosis:    Kidney stone, cholecystitis, appendicitis, RK, others.      Additional sources:    - Discussed/ obtained information from independent historians:      - External (non-ED) record review: Outpatient progress note 7/19/2023    - Chronic or social conditions impacting care:      - Shared decision making:        Orders placed during this visit:  Orders Placed This Encounter   Procedures    Blood Culture - Blood,    Blood Culture - Blood,    CT Abdomen Pelvis With Contrast    [place] Draw    Comprehensive Metabolic Panel    Lipase    Urinalysis With Microscopic If Indicated (No Culture) - Urine, Clean Catch    CBC Auto Differential    Urinalysis, Microscopic Only - Urine, Clean Catch    Lactic Acid, Plasma    STAT Lactic Acid, Reflex    NPO Diet NPO Type: Strict NPO    Undress & Gown    Place Sequential Compression Device    Maintain Sequential Compression Device    ECG 12 Lead Tachycardia    Insert Peripheral IV    CBC & Differential    Green Top (Gel)    Lavender Top    Gold Top - SST    Light Blue Top         Additional orders considered but not ordered:      ED Course/MDM Discussion:    Patient is a 48-year-old male presenting with right-sided abdominal pain.  Symptoms ongoing for the past several days.  He arrives tachycardic with otherwise stable  vital signs.  Labs demonstrate a mild leukocytosis of 11.87.  Creatinine is 1.35.  EKG demonstrated sinus tachycardia with right bundle branch block morphology and rate related ST-T changes on my interpretation.  CT imaging obtained shows evidence for extraluminal air on my interpretation of imaging.  Radiology interpretation notes acute appendicitis with perforation.  Patient is NPO.  He is receiving IV fluids.  Zosyn ordered for antibiotic coverage. He meets SIRS criteria and lactic/blood cultures were ordered. Discussed with Dr. Valdez for consideration of surgical intervention and continued management. Patient taken to OR.                Consultants:    Surgery Dr. Valdez        AS OF 14:15 EDT VITALS:    BP - 161/93  HR - 116  TEMP - 100.5 °F (38.1 °C) (Oral)  O2 SATS - 94%                  DIAGNOSIS  Final diagnoses:   Acute appendicitis, unspecified acute appendicitis type         DISPOSITION  ED Disposition       ED Disposition   Send to OR    Condition   --    Comment   --                   Please note that portions of this document were completed with voice recognition software.        Daren Harman MD  08/03/24 1415      Electronically signed by Daren Harman MD at 08/03/24 1415       Vital Signs (last day)       Date/Time Temp Temp src Pulse Resp BP Patient Position SpO2    08/04/24 0713 97.7 (36.5) Oral -- 18 123/78 Lying --    08/04/24 0418 98.2 (36.8) Oral -- 18 121/73 Lying --    08/03/24 2331 98 (36.7) Oral -- 16 147/89 Lying --    08/03/24 1905 99 (37.2) Oral -- 18 127/84 Lying --    08/03/24 1830 100 (37.8) Oral -- 20 118/72 Lying --    08/03/24 1815 100 (37.8) Oral -- 20 126/76 Lying --    08/03/24 1800 100 (37.8) Oral -- 20 -- Lying --    08/03/24 1745 100 (37.8) Oral 110 20 129/89 Lying --    08/03/24 1730 100 (37.8) Oral 105 20 121/86 Lying --    08/03/24 1715 100.1 (37.8) Oral 110 20 134/83 Lying --    08/03/24 1700 100.1 (37.8) Oral 105 20 127/77 Lying --    08/03/24 1630 100.5 (38.1)  Temporal 107 20 110/62 Lying 92    08/03/24 1625 -- -- 108 20 101/62 Lying 93    08/03/24 1620 -- -- 108 20 115/71 Lying 92    08/03/24 1615 -- -- 109 20 113/63 Lying 92    08/03/24 1610 -- -- 112 20 101/59 Lying 92    08/03/24 1600 -- -- 111 20 111/68 Lying 92    08/03/24 1550 -- -- 110 20 105/70 Lying 91    08/03/24 1546 -- -- 112 20 105/60 Lying 92    08/03/24 1543 -- -- 115 20 108/72 Lying 93    08/03/24 1318 -- -- 116 -- -- -- 94    08/03/24 1315 -- -- 113 -- 161/93 -- 94    08/03/24 1258 -- -- 118 -- 126/86 -- 93    08/03/24 12:06:59 100.5 (38.1) Oral 118 -- -- -- 92    08/03/24 1200 -- -- 117 -- 117/67 -- 90    08/03/24 11:10:35 -- -- 115 -- -- -- 92    08/03/24 1103 -- -- 112 -- 150/92 -- 98    08/03/24 1101 -- -- 123 -- 156/120 -- 95    08/03/24 1045 -- -- -- -- 128/87 -- --    08/03/24 1030 -- -- 113 -- 143/88 -- 95    08/03/24 0952 98.8 (37.1) Oral 133 22 152/96 Sitting 98          Current Facility-Administered Medications   Medication Dose Route Frequency Provider Last Rate Last Admin    sennosides-docusate (PERICOLACE) 8.6-50 MG per tablet 2 tablet  2 tablet Oral BID PRN Aniceto Nye DO        And    polyethylene glycol (MIRALAX) packet 17 g  17 g Oral Daily PRN Aniceto Nye DO        And    bisacodyl (DULCOLAX) EC tablet 5 mg  5 mg Oral Daily PRN Aniceto Nye DO        And    bisacodyl (DULCOLAX) suppository 10 mg  10 mg Rectal Daily PRN Aniceto Nye DO        Calcium Replacement - Follow Nurse / BPA Driven Protocol   Does not apply PRN Aniceto Nye DO        dextrose 5 % and sodium chloride 0.45 % infusion  100 mL/hr Intravenous Continuous Zeeshan Valdez  mL/hr at 08/04/24 0346 100 mL/hr at 08/04/24 0346    HYDROcodone-acetaminophen (NORCO) 7.5-325 MG per tablet 1 tablet  1 tablet Oral Q4H PRN Zeeshan Valdez MD   1 tablet at 08/04/24 0514    HYDROmorphone (DILAUDID) injection 1 mg  1 mg Intravenous Q2H PRN Zeeshan Valdez MD        And    naloxone (NARCAN) injection 0.1 mg  0.1 mg  Intravenous Q5 Min PRN Zeeshan Valdez MD        lactated ringers bolus 2,922 mL  30 mL/kg (Adjusted) Intravenous Once Aniceto Nye DO        Magnesium Standard Dose Replacement - Follow Nurse / BPA Driven Protocol   Does not apply PRN Aniceto Nye DO        nitroglycerin (NITROSTAT) SL tablet 0.4 mg  0.4 mg Sublingual Q5 Min PRN Aniceto Nye DO        Phosphorus Replacement - Follow Nurse / BPA Driven Protocol   Does not apply PRN Aniceto Nye DO        piperacillin-tazobactam (ZOSYN) IVPB 4.5 g IVPB in 100 mL NS (VTB)  4.5 g Intravenous Once Aniceto Nye DO        piperacillin-tazobactam (ZOSYN) IVPB 4.5 g IVPB in 100 mL NS (VTB)  4.5 g Intravenous Q8H Aniceto Nye DO        Potassium Replacement - Follow Nurse / BPA Driven Protocol   Does not apply PRN Aniceto Nye DO        sodium chloride 0.9 % flush 3 mL  3 mL Intravenous Q12H Zeeshan Valdez MD   3 mL at 08/03/24 2145    sodium chloride 0.9 % flush 3-10 mL  3-10 mL Intravenous PRN Zeeshan Valdez MD        sodium chloride 0.9 % infusion 40 mL  40 mL Intravenous PRN Zeeshan Valdez MD        sodium phosphates 15 mmol in sodium chloride 0.9 % 250 mL infusion  15 mmol Intravenous Q3H Aniceto Nye DO         Lab Results (last 24 hours)       Procedure Component Value Units Date/Time    Blood Culture - Blood, Hand, Left [[id number]] Collected: 08/04/24 0800    Specimen: Blood from Hand, Left Updated: 08/04/24 0824    Blood Culture - Blood, Arm, Right [[id number]] Collected: 08/04/24 0809    Specimen: Blood from Arm, Right Updated: 08/04/24 0824    Blood Culture ID, PCR - Blood, Arm, Right [[id number]]  (Abnormal) Collected: 08/03/24 1202    Specimen: Blood from Arm, Right Updated: 08/04/24 0735     BCID, PCR Escherichia coli. Identification by BCID2 PCR.     BOTTLE TYPE Anaerobic Bottle    Narrative:      No resistance genes detected.    Blood Culture - Blood, Arm, Right [[id number]]  (Abnormal) Collected: 08/03/24 1202    Specimen: Blood from Arm,  Right Updated: 08/04/24 0734     Blood Culture Abnormal Stain     Gram Stain Anaerobic Bottle Gram negative bacilli    Narrative:      Less than seven (7) mL's of blood was collected.  Insufficient quantity may yield false negative results.    Phosphorus [[id number]]  (Abnormal) Collected: 08/04/24 0615    Specimen: Blood Updated: 08/04/24 0709     Phosphorus 1.6 mg/dL     Magnesium [[id number]]  (Normal) Collected: 08/04/24 0615    Specimen: Blood Updated: 08/04/24 0655     Magnesium 2.1 mg/dL     Basic Metabolic Panel [[id number]]  (Abnormal) Collected: 08/04/24 0615    Specimen: Blood Updated: 08/04/24 0655     Glucose 174 mg/dL      BUN 12 mg/dL      Creatinine 0.74 mg/dL      Sodium 133 mmol/L      Potassium 4.6 mmol/L      Chloride 100 mmol/L      CO2 22.4 mmol/L      Calcium 8.6 mg/dL      BUN/Creatinine Ratio 16.2     Anion Gap 10.6 mmol/L      eGFR 113.2 mL/min/1.73     Narrative:      GFR Normal >60  Chronic Kidney Disease <60  Kidney Failure <15      CBC & Differential [[id number]]  (Abnormal) Collected: 08/04/24 0615    Specimen: Blood Updated: 08/04/24 0640    Narrative:      The following orders were created for panel order CBC & Differential.  Procedure                               Abnormality         Status                     ---------                               -----------         ------                     CBC Auto Differential[[id number]]        Abnormal            Final result                 Please view results for these tests on the individual orders.    CBC Auto Differential [[id number]]  (Abnormal) Collected: 08/04/24 0615    Specimen: Blood Updated: 08/04/24 0640     WBC 13.05 10*3/mm3      RBC 4.51 10*6/mm3      Hemoglobin 13.6 g/dL      Hematocrit 38.5 %      MCV 85.4 fL      MCH 30.2 pg      MCHC 35.3 g/dL      RDW 12.8 %      RDW-SD 39.8 fl      MPV 10.7 fL      Platelets 156 10*3/mm3      Neutrophil % 88.9 %      Lymphocyte % 6.2 %      Monocyte % 4.2 %      Eosinophil % 0.0 %       "Basophil % 0.2 %      Immature Grans % 0.5 %      Neutrophils, Absolute 11.61 10*3/mm3      Lymphocytes, Absolute 0.81 10*3/mm3      Monocytes, Absolute 0.55 10*3/mm3      Eosinophils, Absolute 0.00 10*3/mm3      Basophils, Absolute 0.02 10*3/mm3      Immature Grans, Absolute 0.06 10*3/mm3      nRBC 0.0 /100 WBC     STAT Lactic Acid, Reflex [[id number]]  (Normal) Collected: 08/03/24 1750    Specimen: Blood Updated: 08/03/24 1825     Lactate 1.7 mmol/L     Procalcitonin [[id number]]  (Abnormal) Collected: 08/03/24 1609    Specimen: Blood Updated: 08/03/24 1712     Procalcitonin 16.94 ng/mL     Narrative:      As a Marker for Sepsis (Non-Neonates):    1. <0.5 ng/mL represents a low risk of severe sepsis and/or septic shock.  2. >2 ng/mL represents a high risk of severe sepsis and/or septic shock.    As a Marker for Lower Respiratory Tract Infections that require antibiotic therapy:    PCT on Admission    Antibiotic Therapy       6-12 Hrs later    >0.5                Strongly Recommended  >0.25 - <0.5        Recommended   0.1 - 0.25          Discouraged              Remeasure/reassess PCT  <0.1                Strongly Discouraged     Remeasure/reassess PCT    As 28 day mortality risk marker: \"Change in Procalcitonin Result\" (>80% or <=80%) if Day 0 (or Day 1) and Day 4 values are available. Refer to [email or web address]    Change in PCT <=80%  A decrease of PCT levels below or equal to 80% defines a positive change in PCT test result representing a higher risk for 28-day all-cause mortality of patients diagnosed with severe sepsis for septic shock.    Change in PCT >80%  A decrease of PCT levels of more than 80% defines a negative change in PCT result representing a lower risk for 28-day all-cause mortality of patients diagnosed with severe sepsis or septic shock.       Basic Metabolic Panel [[id number]]  (Abnormal) Collected: 08/03/24 1609    Specimen: Blood Updated: 08/03/24 1646     Glucose 168 mg/dL  "     BUN 17 mg/dL      Creatinine 1.04 mg/dL      Sodium 131 mmol/L      Potassium 4.6 mmol/L      Chloride 97 mmol/L      CO2 20.9 mmol/L      Calcium 8.2 mg/dL      BUN/Creatinine Ratio 16.3     Anion Gap 13.1 mmol/L      eGFR 89.7 mL/min/1.73     Narrative:      GFR Normal >60  Chronic Kidney Disease <60  Kidney Failure <15      Magnesium [[id number]]  (Normal) Collected: 08/03/24 1609    Specimen: Blood Updated: 08/03/24 1646     Magnesium 1.7 mg/dL     CBC & Differential [[id number]]  (Abnormal) Collected: 08/03/24 1609    Specimen: Blood Updated: 08/03/24 1643    Narrative:      The following orders were created for panel order CBC & Differential.  Procedure                               Abnormality         Status                     ---------                               -----------         ------                     CBC Auto Differential[[id number]]        Abnormal            Final result               Scan Slide[[id number]]                                                                    Please view results for these tests on the individual orders.    CBC Auto Differential [[id number]]  (Abnormal) Collected: 08/03/24 1609    Specimen: Blood Updated: 08/03/24 1643     WBC 13.65 10*3/mm3      RBC 4.96 10*6/mm3      Hemoglobin 14.8 g/dL      Hematocrit 42.6 %      MCV 85.9 fL      MCH 29.8 pg      MCHC 34.7 g/dL      RDW 13.0 %      RDW-SD 40.7 fl      MPV 10.3 fL      Platelets 147 10*3/mm3      Neutrophil % 91.0 %      Lymphocyte % 3.7 %      Monocyte % 3.3 %      Eosinophil % 1.2 %      Basophil % 0.4 %      Immature Grans % 0.4 %      Neutrophils, Absolute 12.41 10*3/mm3      Lymphocytes, Absolute 0.51 10*3/mm3      Monocytes, Absolute 0.45 10*3/mm3      Eosinophils, Absolute 0.17 10*3/mm3      Basophils, Absolute 0.05 10*3/mm3      Immature Grans, Absolute 0.06 10*3/mm3      nRBC 0.0 /100 WBC     Phosphorus [[id number]]  (Abnormal) Collected: 08/03/24 1609    Specimen: Blood Updated: 08/03/24 1642      Phosphorus 2.4 mg/dL     Hemoglobin A1c [[id number]]  (Normal) Collected: 08/03/24 1609    Specimen: Blood Updated: 08/03/24 1631     Hemoglobin A1C 5.20 %     Narrative:      Hemoglobin A1C Ranges:    Increased Risk for Diabetes  5.7% to 6.4%  Diabetes                     >= 6.5%  Diabetic Goal                < 7.0%    Anaerobic Culture - Surgical Site, Abdominal Wall [[id number]] Collected: 08/03/24 1432    Specimen: Surgical Site from Abdominal Wall Updated: 08/03/24 1540    Body Fluid Culture - Surgical Site, Abdominal Wall [[id number]] Collected: 08/03/24 1432    Specimen: Surgical Site from Abdominal Wall Updated: 08/03/24 1539    Blood Culture - Blood, Hand, Left [[id number]] Collected: 08/03/24 1203    Specimen: Blood from Hand, Left Updated: 08/03/24 1203    Lactic Acid, Plasma [[id number]]  (Abnormal) Collected: 08/03/24 1014    Specimen: Blood Updated: 08/03/24 1143     Lactate 2.3 mmol/L     Urinalysis, Microscopic Only - Urine, Clean Catch [[id number]]  (Abnormal) Collected: 08/03/24 1059    Specimen: Urine, Clean Catch Updated: 08/03/24 1117     RBC, UA None Seen /HPF      WBC, UA 0-2 /HPF      Bacteria, UA 2+ /HPF      Squamous Epithelial Cells, UA 7-12 /HPF      Hyaline Casts, UA 13-20 /LPF      Methodology Manual Light Microscopy    Urinalysis With Microscopic If Indicated (No Culture) - Urine, Clean Catch [[id number]]  (Abnormal) Collected: 08/03/24 1059    Specimen: Urine, Clean Catch Updated: 08/03/24 1114     Color, UA Grant     Appearance, UA Cloudy     pH, UA 5.5     Specific Gravity, UA >=1.030     Glucose, UA Negative     Ketones, UA 15 mg/dL (1+)     Bilirubin, UA Moderate (2+)     Blood, UA Trace     Protein,  mg/dL (2+)     Leuk Esterase, UA Trace     Nitrite, UA Positive     Urobilinogen, UA 1.0 E.U./dL    Comprehensive Metabolic Panel [[id number]]  (Abnormal) Collected: 08/03/24 1014    Specimen: Blood Updated: 08/03/24 1039     Glucose 150 mg/dL      BUN 19 mg/dL      Creatinine 1.35  mg/dL      Sodium 133 mmol/L      Potassium 3.6 mmol/L      Chloride 95 mmol/L      CO2 21.4 mmol/L      Calcium 8.9 mg/dL      Total Protein 7.5 g/dL      Albumin 4.1 g/dL      ALT (SGPT) 52 U/L      AST (SGOT) 38 U/L      Alkaline Phosphatase 95 U/L      Total Bilirubin 1.6 mg/dL      Globulin 3.4 gm/dL      A/G Ratio 1.2 g/dL      BUN/Creatinine Ratio 14.1     Anion Gap 16.6 mmol/L      eGFR 65.6 mL/min/1.73     Narrative:      GFR Normal >60  Chronic Kidney Disease <60  Kidney Failure <15      Lipase [[id number]]  (Normal) Collected: 08/03/24 1014    Specimen: Blood Updated: 08/03/24 1039     Lipase 14 U/L     [place] Draw [[id number]] Collected: 08/03/24 1014    Specimen: Blood Updated: 08/03/24 1030    Narrative:      The following orders were created for panel order [place] Draw.  Procedure                               Abnormality         Status                     ---------                               -----------         ------                     Green Top (Gel)[[id number]]                                  Final result               Lavender Top[[id number]]                                     Final result               Gold Top - SST[[id number]]                                   Final result               Light Blue Top[[id number]]                                   Final result                 Please view results for these tests on the individual orders.    Green Top (Gel) [[id number]] Collected: 08/03/24 1014    Specimen: Blood Updated: 08/03/24 1030     Extra Tube Hold for add-ons.     Comment: Auto resulted.       Lavender Top [[id number]] Collected: 08/03/24 1014    Specimen: Blood Updated: 08/03/24 1030     Extra Tube hold for add-on     Comment: Auto resulted       Gold Top - SST [[id number]] Collected: 08/03/24 1014    Specimen: Blood Updated: 08/03/24 1030     Extra Tube Hold for add-ons.     Comment: Auto resulted.       Light Blue Top [[id number]] Collected: 08/03/24 1014    Specimen: Blood Updated:  08/03/24 1030     Extra Tube Hold for add-ons.     Comment: Auto resulted       CBC & Differential [[id number]]  (Abnormal) Collected: 08/03/24 1014    Specimen: Blood Updated: 08/03/24 1026    Narrative:      The following orders were created for panel order CBC & Differential.  Procedure                               Abnormality         Status                     ---------                               -----------         ------                     CBC Auto Differential[[id number]]        Abnormal            Final result                 Please view results for these tests on the individual orders.    CBC Auto Differential [[id number]]  (Abnormal) Collected: 08/03/24 1014    Specimen: Blood Updated: 08/03/24 1026     WBC 11.87 10*3/mm3      RBC 5.48 10*6/mm3      Hemoglobin 16.3 g/dL      Hematocrit 46.2 %      MCV 84.3 fL      MCH 29.7 pg      MCHC 35.3 g/dL      RDW 12.7 %      RDW-SD 39.2 fl      MPV 10.4 fL      Platelets 168 10*3/mm3      Neutrophil % 83.0 %      Lymphocyte % 8.5 %      Monocyte % 7.3 %      Eosinophil % 0.2 %      Basophil % 0.5 %      Immature Grans % 0.5 %      Neutrophils, Absolute 9.85 10*3/mm3      Lymphocytes, Absolute 1.01 10*3/mm3      Monocytes, Absolute 0.87 10*3/mm3      Eosinophils, Absolute 0.02 10*3/mm3      Basophils, Absolute 0.06 10*3/mm3      Immature Grans, Absolute 0.06 10*3/mm3      nRBC 0.0 /100 WBC           Imaging Results (Last 24 Hours)       Procedure Component Value Units Date/Time    CT Abdomen Pelvis With Contrast [[id number]] Collected: 08/03/24 1102     Updated: 08/03/24 1115    Narrative:      PROCEDURE: CT ABDOMEN PELVIS W CONTRAST-     HISTORY: RUQ, R flnk pain, eval kidney stone, cholecystitis     COMPARISON: November 5, 2018..     PROCEDURE: The patient was injected with IV contrast. Axial images were  obtained from the lung bases to the pubic symphysis by computed  tomography. This study was performed with techniques to keep radiation  doses as low as  reasonably achievable, (ALARA). Individualized dose  reduction techniques using automated exposure control or adjustment of  mA and/or kV according to the patient size were employed.     FINDINGS:     ABDOMEN: The lung bases are clear. The heart is proper size. The liver  demonstrates diffuse fatty infiltration and is enlarged up to 21 cm  without focal mass. Gallbladder is present and contains at least one  gallstone. No wall thickening identified. There is minimal fluid  adjacent to the inferior tip of the right lobe of the liver extending  into the right lower quadrant. The spleen is mildly enlarged without  focal abnormality. No adrenal mass is present.  The pancreas is  unremarkable. The kidneys are unremarkable, without evidence of mass or  hydronephrosis. The aorta is proper caliber.         PELVIS: The GI tract demonstrates air-fluid levels in nondistended small  bowel, consider ileus. The appendix is retrocecal and extends into the  right upper quadrant adjacent to the inferior tip of the right lobe of  the liver best seen series 3 image 48. Appendix is dilated up to 15 mm  and contains an appendicolith proximally. It contains air but there is  also extraluminal air anteriorly best seen series 2 image 47 consistent  with rupture. There is infiltration of the surrounding fat. Small  mesenteric lymph nodes are seen medial to the inflammatory process. No  free fluid identified in the pelvis. The urinary bladder is mostly  collapsed. Prostate is normal in size. There is no free fluid,  adenopathy, or inflammatory process.       Impression:      Acute appendicitis of a retrocecal appendix containing an  appendicalith and extending up to the medial aspect of the inferior  right lobe of the liver with small amount of free fluid and extraluminal  air consistent with rupture.  Dr. Harman notified in the ER at 11:10am  August 3, 2024.     Cholelithiasis.     Enlarged, fatty infiltrated liver with mild splenomegaly;  "recommend  correlation with liver function tests.     CTDI: 16.31 mGy  DLP:1047.06 mGy.cm     This report was signed and finalized on 8/3/2024 11:13 AM by Dora Rivera MD.             Orders (last 24 hrs)        Start     Ordered    08/04/24 2102  Phosphorus  Timed         08/04/24 0801    08/04/24 1445  piperacillin-tazobactam (ZOSYN) IVPB 4.5 g IVPB in 100 mL NS (VTB)  Every 8 Hours         08/04/24 0752    08/04/24 0900  sodium phosphates 15 mmol in sodium chloride 0.9 % 250 mL infusion  Every 3 Hours         08/04/24 0801    08/04/24 0845  piperacillin-tazobactam (ZOSYN) IVPB 4.5 g IVPB in 100 mL NS (VTB)  Once         08/04/24 0752    08/04/24 0818  Inpatient Admission  Once         08/04/24 0817    08/04/24 0753  Blood Culture - Blood, Hand, Left  Once        Placed in \"And\" Linked Group    08/04/24 0752    08/04/24 0753  Blood Culture - Blood, Arm, Right  Once        Placed in \"And\" Linked Group    08/04/24 0752    08/04/24 0750  Potassium Replacement - Follow Nurse / BPA Driven Protocol  As Needed         08/04/24 0751    08/04/24 0750  Magnesium Standard Dose Replacement - Follow Nurse / BPA Driven Protocol  As Needed         08/04/24 0751    08/04/24 0750  Phosphorus Replacement - Follow Nurse / BPA Driven Protocol  As Needed         08/04/24 0751    08/04/24 0750  Calcium Replacement - Follow Nurse / BPA Driven Protocol  As Needed         08/04/24 0751    08/04/24 0600  CBC & Differential  Morning Draw,   Status:  Canceled         08/03/24 1520    08/04/24 0600  Basic Metabolic Panel  Morning Draw,   Status:  Canceled         08/03/24 1520    08/04/24 0600  CBC Auto Differential  PROCEDURE ONCE         08/03/24 2202    08/04/24 0300  Blood Culture ID, PCR - Blood, Arm, Right  Once         08/04/24 0259    08/03/24 2100  sodium chloride 0.9 % flush 3 mL  Every 12 Hours Scheduled         08/03/24 1520    08/03/24 1800  Oral Care  2 Times Daily       08/03/24 1559    08/03/24 1715  STAT Lactic Acid, Reflex  " "PROCEDURE ONCE         08/03/24 1143    08/03/24 1711  Continuous Pulse Oximetry  Continuous         08/03/24 1710    08/03/24 1711  Anesthesia Follow-Up  Once        Provider:  (Not yet assigned)    08/03/24 1710    08/03/24 1645  lactated ringers bolus 2,922 mL  Once         08/03/24 1559    08/03/24 1645  piperacillin-tazobactam (ZOSYN) IVPB 3.375 g IVPB in 100 mL NS (VTB)  Once,   Status:  Discontinued         08/03/24 1559    08/03/24 1620  Scan Slide  Once,   Status:  Canceled         08/03/24 1619    08/03/24 1615  ipratropium-albuterol (DUO-NEB) nebulizer solution 3 mL  Once,   Status:  Discontinued         08/03/24 1526    08/03/24 1615  Midazolam HCl (PF) (VERSED) injection 2 mg  Once,   Status:  Discontinued         08/03/24 1526    08/03/24 1615  dextrose 5 % and sodium chloride 0.45 % infusion  Continuous         08/03/24 1520    08/03/24 1600  Strict Intake and Output  Every 4 Hours       08/03/24 1520    08/03/24 1600  Incentive Spirometry  Every Hour While Awake       08/03/24 1520    08/03/24 1600  Strip JERRY drain  Every 8 Hours         08/03/24 1520    08/03/24 1600  Basic Metabolic Panel  Daily       08/03/24 1559    08/03/24 1600  CBC & Differential  Daily       08/03/24 1559    08/03/24 1600  Magnesium  Daily       08/03/24 1559    08/03/24 1600  Phosphorus  Daily       08/03/24 1559    08/03/24 1600  CBC Auto Differential  PROCEDURE ONCE         08/03/24 1559    08/03/24 1559  Hemoglobin A1c  Once         08/03/24 1559    08/03/24 1559  Insert Peripheral IV  Once         08/03/24 1559    08/03/24 1558  sennosides-docusate (PERICOLACE) 8.6-50 MG per tablet 2 tablet  2 Times Daily PRN        Placed in \"And\" Linked Group    08/03/24 1559    08/03/24 1558  polyethylene glycol (MIRALAX) packet 17 g  Daily PRN        Placed in \"And\" Linked Group    08/03/24 1559    08/03/24 1558  bisacodyl (DULCOLAX) EC tablet 5 mg  Daily PRN        Placed in \"And\" Linked Group    08/03/24 1559    08/03/24 1558  " "bisacodyl (DULCOLAX) suppository 10 mg  Daily PRN        Placed in \"And\" Linked Group    08/03/24 1559    08/03/24 1558  Code Status and Medical Interventions: CPR (Attempt to Resuscitate); Full Support  Continuous         08/03/24 1559    08/03/24 1558  Continuous Cardiac Monitoring  Continuous        Comments: Follow Standing Orders As Outlined in Process Instructions (Open Order Report to View Full Instructions)    08/03/24 1559    08/03/24 1558  Telemetry - Place Orders & Notify Provider of Results When Patient Experiences Acute Chest Pain, Dysrhythmia or Respiratory Distress  Continuous        Comments: Open Order Report to View Parameters Requiring Provider Notification    08/03/24 1559    08/03/24 1558  May Be Off Telemetry for Tests  Continuous         08/03/24 1559    08/03/24 1558  Continuous Pulse Oximetry  Continuous,   Status:  Canceled         08/03/24 1559    08/03/24 1558  Activity - Ad Tonya  Until Discontinued         08/03/24 1559    08/03/24 1557  nitroglycerin (NITROSTAT) SL tablet 0.4 mg  Every 5 Minutes PRN         08/03/24 1559    08/03/24 1557  Procalcitonin  STAT         08/03/24 1559    08/03/24 1557  Intake & Output  Every Shift       08/03/24 1559    08/03/24 1557  Weigh Patient  Once         08/03/24 1559    08/03/24 1527  Vital signs every 5 minutes for 15 minutes, every 15 minutes thereafter.  Once,   Status:  Canceled         08/03/24 1526    08/03/24 1527  Notify Anesthesia of Any Acute Changes in Patient Condition  Continuous,   Status:  Canceled        Comments: Open Order Report to View Parameters Requiring Provider Notification    08/03/24 1526    08/03/24 1527  Notify Anesthesia for Unrelieved Pain  Continuous,   Status:  Canceled        Comments: Open Order Report to View Parameters Requiring Provider Notification    08/03/24 1526    08/03/24 1527  Once DC criteria to floor met, follow surgeon's orders.  Continuous,   Status:  Canceled         08/03/24 1526    08/03/24 1527  " "Discharge patient from PACU when discharge criteria is met.  Continuous,   Status:  Canceled         08/03/24 1526    08/03/24 1526  ondansetron (ZOFRAN) injection 4 mg  Once As Needed,   Status:  Discontinued         08/03/24 1526    08/03/24 1526  HYDROmorphone (DILAUDID) injection 0.5 mg  Every 10 Minutes PRN,   Status:  Discontinued         08/03/24 1526    08/03/24 1526  meperidine (DEMEROL) injection 25 mg  Every 1 Hour PRN,   Status:  Discontinued         08/03/24 1526    08/03/24 1526  LORazepam (ATIVAN) injection 1 mg  Every 4 Hours PRN,   Status:  Discontinued         08/03/24 1526    08/03/24 1521  Inpatient Hospitalist Consult  Once        Specialty:  Hospitalist  Provider:  Aniceto Nye DO    08/03/24 1520    08/03/24 1521  Kerley to admit, José Miguel to consult  Nursing Communication  Once        Comments: Kerley to admit, José Miguel to consult    08/03/24 1520    08/03/24 1520  HYDROcodone-acetaminophen (NORCO) 7.5-325 MG per tablet 1 tablet  Every 4 Hours PRN         08/03/24 1520    08/03/24 1520  Diet: Liquid; Clear Liquid; Fluid Consistency: Thin (IDDSI 0)  Diet Effective Now         08/03/24 1520    08/03/24 1520  Antibiotic Previously Ordered  Once         08/03/24 1520    08/03/24 1519  HYDROmorphone (DILAUDID) injection 1 mg  Every 2 Hours PRN        Placed in \"And\" Linked Group    08/03/24 1520    08/03/24 1519  naloxone (NARCAN) injection 0.1 mg  Every 5 Minutes PRN        Placed in \"And\" Linked Group    08/03/24 1520    08/03/24 1519  Turn, Cough & Deep Breathe  Once         08/03/24 1520    08/03/24 1519  Vital Signs  Per Hospital Policy         08/03/24 1520    08/03/24 1519  Oxygen Therapy- Nasal Cannula; 2 LPM  Continuous         08/03/24 1520    08/03/24 1519  Insert Peripheral IV  Once         08/03/24 1520    08/03/24 1519  Saline Lock & Maintain IV Access  Continuous         08/03/24 1520    08/03/24 1519  Initiate Observation Status  Once         08/03/24 1520    08/03/24 1518  sodium " chloride 0.9 % flush 3-10 mL  As Needed         08/03/24 1520    08/03/24 1518  sodium chloride 0.9 % infusion 40 mL  As Needed         08/03/24 1520    08/03/24 1513  Wound Culture - Surgical Site, Abdominal Wall  RELEASE UPON ORDERING,   Status:  Canceled         08/03/24 1513    08/03/24 1513  TISSUE EXAM, [facility] ([facility])  RELEASE UPON ORDERING         08/03/24 1513    08/03/24 1513  Anaerobic Culture - Surgical Site, Abdominal Wall  RELEASE UPON ORDERING         08/03/24 1513    08/03/24 1513  Body Fluid Culture - Surgical Site, Abdominal Wall  RELEASE UPON ORDERING         08/03/24 1513    08/03/24 1438  bupivacaine (PF) (MARCAINE) 0.5 % injection  As Needed,   Status:  Discontinued         08/03/24 1438    08/03/24 1403  Place Sequential Compression Device  Once         08/03/24 1402    08/03/24 1403  Maintain Sequential Compression Device  Continuous,   Status:  Canceled         08/03/24 1402    08/03/24 1200  piperacillin-tazobactam (ZOSYN) IVPB 3.375 g IVPB in 100 mL NS (VTB)  Every 6 Hours,   Status:  Discontinued         08/03/24 1115    08/03/24 1200  piperacillin-tazobactam (ZOSYN) IVPB 4.5 g IVPB in 100 mL NS (VTB)  Every 6 Hours,   Status:  Discontinued         08/03/24 1119    08/03/24 1115  Lactic Acid, Plasma  Once         08/03/24 1115    08/03/24 1115  Blood Culture - Blood, Hand, Left  Once         08/03/24 1115    08/03/24 1115  Blood Culture - Blood, Arm, Right  Once         08/03/24 1115    08/03/24 1106  iopamidol (ISOVUE-300) 61 % injection 100 mL  Once in Imaging         08/03/24 1050    08/03/24 1106  Urinalysis, Microscopic Only - Urine, Clean Catch  Once         08/03/24 1105    08/03/24 1040  lactated ringers bolus 1,000 mL  Once         08/03/24 1024    08/03/24 1040  morphine injection 4 mg  Once         08/03/24 1024    08/03/24 1040  ondansetron (ZOFRAN) injection 4 mg  Once         08/03/24 1024    08/03/24 1021  ECG 12 Lead Tachycardia  Once         08/03/24 1020     08/03/24 1021  CT Abdomen Pelvis With Contrast  1 Time Imaging         08/03/24 1020    08/03/24 0959  NPO Diet NPO Type: Strict NPO  Diet Effective Now,   Status:  Canceled         08/03/24 0958    08/03/24 0959  Undress & Gown  Once         08/03/24 0958    08/03/24 0959  Insert Peripheral IV  Once         08/03/24 0958    08/03/24 0959  [place] Draw  Once         08/03/24 0958    08/03/24 0959  CBC & Differential  Once         08/03/24 0958    08/03/24 0959  Comprehensive Metabolic Panel  Once         08/03/24 0958    08/03/24 0959  Lipase  Once         08/03/24 0958    08/03/24 0959  Urinalysis With Microscopic If Indicated (No Culture) - Urine, Clean Catch  Once         08/03/24 0958    08/03/24 0959  Green Top (Gel)  PROCEDURE ONCE         08/03/24 0958    08/03/24 0959  Lavender Top  PROCEDURE ONCE         08/03/24 0958    08/03/24 0959  Gold Top - SST  PROCEDURE ONCE         08/03/24 0958    08/03/24 0959  Light Blue Top  PROCEDURE ONCE         08/03/24 0958    08/03/24 0959  CBC Auto Differential  PROCEDURE ONCE         08/03/24 0958    08/03/24 0958  sodium chloride 0.9 % flush 10 mL  As Needed,   Status:  Discontinued         08/03/24 0958    Unscheduled  Oxygen Therapy- Nasal Cannula; 2 LPM  Continuous PRN       08/03/24 1710    Unscheduled  Ice pack to incision  As Needed       08/03/24 1520    Unscheduled  Up in Chair  As Needed       08/03/24 1520                     Operative/Procedure Notes (last 24 hours)        Zeeshan Valdez MD at 08/03/24 1412          PATIENT:    Alek Ray    DATE OF SURGERY:    8/3/2024    PHYSICIAN:    Zeeshan Valdez MD    REFERRING PHYSICIAN:  Unknown    YOB: 1975    PREOPERATIVE DIAGNOSIS:  Acute appendicitis    POSTOPERATIVE DIAGNOSIS:  Acute ruptured appendicitis with abscess    PROCEDURE:      1) Robotic assisted laparoscopic appendectomy   2) Drainage of abscess  3) Placement of 10 mm flat JERRY drain in right gutter and suprahepatic  space    EBL:  Less than 50 cc    COMPLICATIONS:  None    OPERATIVE PROCEDURE:  The patient was taken to the operating room in the normal manner and given general endotracheal anesthesia.  The patient was also given preoperative IV antibiotics.  I did discuss the situation with the patient preoperatively and I marked them accordingly.  An appropriate timeout was performed intraoperatively prior to the incision.      A left upper quadrant incision was made to insert a Veress needle to insufflate the abdomen with CO2.  A 8 mm Optiview was inserted here and good visualization was obtained. This was later changed to a 12 mm trochar.  A separate left lower quadrant 8 mm Optiview was inserted under direct vision along with the left mid quadrant 8 mm Optiview.      The appendix was found to be in the right lower quadrant and was dissected free. It was retrocecal in nature going to the tip of the liver, there was purulent material in the right gutter and above the liver. It was grasped with graspers without teeth.  The appendiceal mesentery was dissected free and then divided with  a robotic GI stapling device as was the base of the appendix itself.  The appendix was placed in the Endobag and removed via the right upper quadrant 12 mm port site.  Several clips also were applied for further hemostasis.  I did suction the purulent material in order to drain the abscess, gram stain and cultures were sent.    Bovie electrocautery was used very sparingly on the appendiceal mesentery for further hemostasis.  The abdomen was clean and dry at this point in time. I did then go back to the field and placed a 10 mm JERRY drain after making an incision in the right lower quadrant and inserting this through the abdominal wall and placing it robotically into the right gutter and above the liver.  All trocars were injected with Marcaine for postoperative anesthetic and then removed under direct visualization.  0-Vicryl suture was used to  "close the fascia and 4-0 subcuticular stitch and Steri-Strips were used to close the skin.  The patient was stable at this point in time and subsequently transferred back to the recovery room in stable condition.    PLAN:  I did have a detailed discussion with Noemí at [phone] with regard to the findings at the time of operative intervention, the patient's current condition, and the postoperative treatment plan.  They had no questions for me at the end of the discussion.  I did discuss the situation with Dr. Kerley and he will need to come in and be admitted overnight.      Zeeshan Valdez MD  8/3/2024  15:21 EDT    Electronically signed by Zeeshan Valdez MD at 24 0822       Physician Progress Notes (most recent note)    No notes of this type exist for this encounter.          Consult Notes (most recent note)        Aniceto Nye DO at 24 1540        Consult Orders    1. Inpatient Hospitalist Consult [[id number]] ordered by Zeeshan Valdez MD at 24 1520                     [facility]      Name:  Alek Ray   Age:  48 y.o.  Sex:  male  :  1975  MRN:  [id number]   Visit Number:  [id number]  Admission Date:  8/3/2024  Date Of Service:  24  Primary Care Physician:  Provider, No Known    Consulting Physician:    Aniceto Nye DO    Referring Physician:    Dr. Zeeshan Valdze    Reason For Consult:    Sepsis    Chief Complaint:     Abdominal pain    History Of Presenting Illness:      Patient is a 48-year-old male brought to the emergency department for complaints of right-sided abdominal pain.  It has been ongoing for several days.  Patient reports associated nausea and vomiting with the abdominal pain since onset.  Patient reports that he has taken Tylenol and Motrin without any improvement.  Reports feeling similar quality to \"gallbladder attack\" several years ago.  Abdominal pain gets worse with movement.    In the emergency " department, significant laboratory data revealed creatinine 1.35, glucose 150, lactic acid 2.3, white count 11.8.  CT abdomen pelvis with contrast showed acute appendicitis with small amount of free fluid and extraluminal air consistent with ruptured.  General surgery, Dr. Valdez, was consulted from the ED.  Patient was taken to the OR for emergent appendectomy with drainage of abscess.  10 mm JERRY drain was placed in the right lower quadrant.  Patient tolerated the surgical intervention well without any immediate postoperative complications.  Hospitalist service was consulted for medical management of sepsis, chronic medical conditions, and transfer of care starting August 4, 2024.    Review Of Systems:     All systems were reviewed and negative except for: Summarized in HPI    Past Medical History: Patient  has a past medical history of Alvarez esophagus (???), Epigastric pain, and Snores.    Past Surgical History: Patient  has a past surgical history that includes Colonoscopy (over 10 years ago); [place] tooth extraction; and Esophagogastroduodenoscopy (N/A, 12/12/2018).    Social History: Patient  reports that he has never smoked. He has never used smokeless tobacco. He reports current alcohol use of about 2.0 - 3.0 standard drinks of alcohol per week. He reports that he does not use drugs.    Family History: Patient's family history has been reviewed and found to be noncontributory.     Allergies:      Patient has no known allergies.    Home Medications:    Prior to Admission Medications       Prescriptions Last Dose Informant Patient Reported? Taking?    pantoprazole (PROTONIX) 40 MG EC tablet   No No    TAKE 1 TABLET BY MOUTH DAILY 30 MINUTES BEFORE BREAKFAST    sodium-potassium-magnesium sulfates (Suprep Bowel Prep Kit) 17.5-3.13-1.6 GM/177ML solution oral solution   No No    Take 1 bottle by mouth Take As Directed. Follow instructions that were mailed to your home. If you didn't receive these call [phone]  [phone].             Medication Review:     I have reviewed the patient's active and prn medications.      Vital Signs:    Temp:  [98.8 °F (37.1 °C)-100.5 °F (38.1 °C)] 100.5 °F (38.1 °C)  Heart Rate:  [112-133] 116  Resp:  [22] 22  BP: (117-161)/() 161/93        08/03/24  0952   Weight: 127 kg (280 lb)       Body mass index is 37.97 kg/m².    Physical Exam:     General Appearance:  Alert and cooperative.  Ill-appearing   Head:  Atraumatic and normocephalic.   Eyes: Conjunctivae and sclerae normal, no icterus. No pallor.   Ears:  Ears with no abnormalities noted.   Throat: No oral lesions, no thrush, oral mucosa moist.   Neck: Supple, trachea midline, no thyromegaly.   Back:   No kyphoscoliosis present. No tenderness to palpation.   Lungs:   Breath sounds heard bilaterally equally.  No crackles or wheezing. No pleural rub or bronchial breathing.   Heart:  Normal S1 and S2, no murmur, no gallop, no rub. No JVD.   Abdomen:   Hypoactive bowel sounds, no masses, no organomegaly. Soft, tenderness to palpation diffusely throughout, worse on the right than the left.,  Mild distention noted, no rebound tenderness.  No guarding present.  No surgical abdomen at this time.  Anterior abdominal incision bandages clean/dry/intact.  JERRY bulb noted in the right lower quadrant.   Extremities: Supple, no edema, no cyanosis, no clubbing.   Pulses: Pulses palpable bilaterally.   Skin: No bleeding or rash.   Neurologic: Alert and oriented x 3. No facial asymmetry. Moves all four limbs. No tremors.      Laboratory data:    Results from last 7 days   Lab Units 08/03/24  1014   SODIUM mmol/L 133*   POTASSIUM mmol/L 3.6   CHLORIDE mmol/L 95*   CO2 mmol/L 21.4*   BUN mg/dL 19   CREATININE mg/dL 1.35*   CALCIUM mg/dL 8.9   BILIRUBIN mg/dL 1.6*   ALK PHOS U/L 95   ALT (SGPT) U/L 52*   AST (SGOT) U/L 38   GLUCOSE mg/dL 150*     Results from last 7 days   Lab Units 08/03/24  1014   WBC 10*3/mm3 11.87*   HEMOGLOBIN g/dL 16.3   HEMATOCRIT %  46.2   PLATELETS 10*3/mm3 168                     Results from last 7 days   Lab Units 08/03/24  1014   LIPASE U/L 14         Results from last 7 days   Lab Units 08/03/24  1059   COLOR UA  [place]*   GLUCOSE UA  Negative   KETONES UA  15 mg/dL (1+)*   BLOOD UA  Trace*   LEUKOCYTES UA  Trace*   PH, URINE  5.5   BILIRUBIN UA  Moderate (2+)*   UROBILINOGEN UA  1.0 E.U./dL     Pain Management Panel           No data to display                      Radiology:    CT Abdomen Pelvis With Contrast    Result Date: 8/3/2024  PROCEDURE: CT ABDOMEN PELVIS W CONTRAST-  HISTORY: RUQ, R flnk pain, eval kidney stone, cholecystitis  COMPARISON: November 5, 2018..  PROCEDURE: The patient was injected with IV contrast. Axial images were obtained from the lung bases to the pubic symphysis by computed tomography. This study was performed with techniques to keep radiation doses as low as reasonably achievable, (ALARA). Individualized dose reduction techniques using automated exposure control or adjustment of mA and/or kV according to the patient size were employed.  FINDINGS:  ABDOMEN: The lung bases are clear. The heart is proper size. The liver demonstrates diffuse fatty infiltration and is enlarged up to 21 cm without focal mass. Gallbladder is present and contains at least one gallstone. No wall thickening identified. There is minimal fluid adjacent to the inferior tip of the right lobe of the liver extending into the right lower quadrant. The spleen is mildly enlarged without focal abnormality. No adrenal mass is present.  The pancreas is unremarkable. The kidneys are unremarkable, without evidence of mass or hydronephrosis. The aorta is proper caliber.     PELVIS: The GI tract demonstrates air-fluid levels in nondistended small bowel, consider ileus. The appendix is retrocecal and extends into the right upper quadrant adjacent to the inferior tip of the right lobe of the liver best seen series 3 image 48. Appendix is dilated up to  15 mm and contains an appendicolith proximally. It contains air but there is also extraluminal air anteriorly best seen series 2 image 47 consistent with rupture. There is infiltration of the surrounding fat. Small mesenteric lymph nodes are seen medial to the inflammatory process. No free fluid identified in the pelvis. The urinary bladder is mostly collapsed. Prostate is normal in size. There is no free fluid, adenopathy, or inflammatory process.      Acute appendicitis of a retrocecal appendix containing an appendicalith and extending up to the medial aspect of the inferior right lobe of the liver with small amount of free fluid and extraluminal air consistent with rupture.  Dr. Harman notified in the ER at 11:10am August 3, 2024.  Cholelithiasis.  Enlarged, fatty infiltrated liver with mild splenomegaly; recommend correlation with liver function tests.  CTDI: 16.31 mGy DLP:1047.06 mGy.cm  This report was signed and finalized on 8/3/2024 11:13 AM by Dora Rivera MD.       Assessment:     Severe sepsis secondary to ruptured appendicitis  Appendicitis  Lactic acidosis  Acute renal insufficiency  Hyperglycemia    Recommendations/Plan:     Severe sepsis secondary to ruptured appendicitis  Appendicitis  Lactic acidosis  Patient meets sepsis criteria with leukocytosis, tachycardia.  Source of infection ruptured appendix.  Initial lactic acid greater than 2, acute renal insufficiency present.  IV Zosyn.  IV antibiotics.  Cultures sent off intraoperatively, will await results.  POD#0 RA Appendectomy with abscess drainage.  Infection control has been reasonably achieved.  Pain control per general surgery.  Liquid diet  Right lower quadrant JERRY drain present, 60 mL out postoperatively, which was disposed of.  By the time of exam, another 10 mL present.  Serosanguineous in nature, does not appear to be any purulent material.  Acute renal insufficiency  IV fluids.  Avoid nephrotoxic agents as appropriate.  If no improvement  next 24 to 48 hours, consider nephrology consultation.  Hyperglycemia  Check A1c.    Thank you for this consult. Please do not hesitate to call me for any questions.    Aniceto Nye DO  08/03/24  15:40 EDT    Dictated utilizing Dragon dictation.     Electronically signed by Aniceto Nye DO at 08/03/24 2913

## 2024-08-04 NOTE — PLAN OF CARE
Goal Outcome Evaluation:              Outcome Evaluation: VSS. No acute events during this shift. No concerns voiced at this time.                                numerical 0-10

## 2024-08-05 ENCOUNTER — TELEPHONE (OUTPATIENT)
Dept: SURGERY | Facility: CLINIC | Age: 49
End: 2024-08-05
Payer: COMMERCIAL

## 2024-08-05 VITALS
HEART RATE: 96 BPM | RESPIRATION RATE: 16 BRPM | WEIGHT: 267.2 LBS | SYSTOLIC BLOOD PRESSURE: 131 MMHG | BODY MASS INDEX: 36.19 KG/M2 | DIASTOLIC BLOOD PRESSURE: 86 MMHG | HEIGHT: 72 IN | OXYGEN SATURATION: 93 % | TEMPERATURE: 98.6 F

## 2024-08-05 LAB
ANION GAP SERPL CALCULATED.3IONS-SCNC: 11.3 MMOL/L (ref 5–15)
BASOPHILS # BLD AUTO: 0.02 10*3/MM3 (ref 0–0.2)
BASOPHILS NFR BLD AUTO: 0.2 % (ref 0–1.5)
BUN SERPL-MCNC: 13 MG/DL (ref 6–20)
BUN/CREAT SERPL: 16.3 (ref 7–25)
CALCIUM SPEC-SCNC: 8.3 MG/DL (ref 8.6–10.5)
CHLORIDE SERPL-SCNC: 101 MMOL/L (ref 98–107)
CO2 SERPL-SCNC: 24.7 MMOL/L (ref 22–29)
CREAT SERPL-MCNC: 0.8 MG/DL (ref 0.76–1.27)
DEPRECATED RDW RBC AUTO: 40.8 FL (ref 37–54)
EGFRCR SERPLBLD CKD-EPI 2021: 110.5 ML/MIN/1.73
EOSINOPHIL # BLD AUTO: 0 10*3/MM3 (ref 0–0.4)
EOSINOPHIL NFR BLD AUTO: 0 % (ref 0.3–6.2)
ERYTHROCYTE [DISTWIDTH] IN BLOOD BY AUTOMATED COUNT: 13 % (ref 12.3–15.4)
GLUCOSE SERPL-MCNC: 121 MG/DL (ref 65–99)
HCT VFR BLD AUTO: 37.5 % (ref 37.5–51)
HGB BLD-MCNC: 13 G/DL (ref 13–17.7)
IMM GRANULOCYTES # BLD AUTO: 0.05 10*3/MM3 (ref 0–0.05)
IMM GRANULOCYTES NFR BLD AUTO: 0.4 % (ref 0–0.5)
LYMPHOCYTES # BLD AUTO: 1.4 10*3/MM3 (ref 0.7–3.1)
LYMPHOCYTES NFR BLD AUTO: 11.2 % (ref 19.6–45.3)
MAGNESIUM SERPL-MCNC: 2 MG/DL (ref 1.6–2.6)
MCH RBC QN AUTO: 29.6 PG (ref 26.6–33)
MCHC RBC AUTO-ENTMCNC: 34.7 G/DL (ref 31.5–35.7)
MCV RBC AUTO: 85.4 FL (ref 79–97)
MONOCYTES # BLD AUTO: 0.77 10*3/MM3 (ref 0.1–0.9)
MONOCYTES NFR BLD AUTO: 6.2 % (ref 5–12)
NEUTROPHILS NFR BLD AUTO: 10.21 10*3/MM3 (ref 1.7–7)
NEUTROPHILS NFR BLD AUTO: 82 % (ref 42.7–76)
NRBC BLD AUTO-RTO: 0 /100 WBC (ref 0–0.2)
PHOSPHATE SERPL-MCNC: 2.8 MG/DL (ref 2.5–4.5)
PLATELET # BLD AUTO: 208 10*3/MM3 (ref 140–450)
PMV BLD AUTO: 10.4 FL (ref 6–12)
POTASSIUM SERPL-SCNC: 4 MMOL/L (ref 3.5–5.2)
RBC # BLD AUTO: 4.39 10*6/MM3 (ref 4.14–5.8)
SODIUM SERPL-SCNC: 137 MMOL/L (ref 136–145)
WBC NRBC COR # BLD AUTO: 12.45 10*3/MM3 (ref 3.4–10.8)

## 2024-08-05 PROCEDURE — 85025 COMPLETE CBC W/AUTO DIFF WBC: CPT | Performed by: FAMILY MEDICINE

## 2024-08-05 PROCEDURE — 83735 ASSAY OF MAGNESIUM: CPT | Performed by: FAMILY MEDICINE

## 2024-08-05 PROCEDURE — 25010000002 PIPERACILLIN SOD-TAZOBACTAM PER 1 G: Performed by: FAMILY MEDICINE

## 2024-08-05 PROCEDURE — 84100 ASSAY OF PHOSPHORUS: CPT | Performed by: FAMILY MEDICINE

## 2024-08-05 PROCEDURE — 99024 POSTOP FOLLOW-UP VISIT: CPT | Performed by: SURGERY

## 2024-08-05 PROCEDURE — 99239 HOSP IP/OBS DSCHRG MGMT >30: CPT | Performed by: FAMILY MEDICINE

## 2024-08-05 PROCEDURE — 25810000003 SODIUM CHLORIDE 0.9 % SOLUTION 250 ML FLEX CONT: Performed by: FAMILY MEDICINE

## 2024-08-05 PROCEDURE — 80048 BASIC METABOLIC PNL TOTAL CA: CPT | Performed by: FAMILY MEDICINE

## 2024-08-05 RX ORDER — HYDROCODONE BITARTRATE AND ACETAMINOPHEN 7.5; 325 MG/1; MG/1
1 TABLET ORAL EVERY 6 HOURS PRN
Qty: 15 TABLET | Refills: 0 | Status: SHIPPED | OUTPATIENT
Start: 2024-08-05

## 2024-08-05 RX ORDER — AMOXICILLIN AND CLAVULANATE POTASSIUM 875; 125 MG/1; MG/1
1 TABLET, FILM COATED ORAL 2 TIMES DAILY
Qty: 14 TABLET | Refills: 0 | Status: SHIPPED | OUTPATIENT
Start: 2024-08-05 | End: 2024-08-12

## 2024-08-05 RX ADMIN — DEXTROSE AND SODIUM CHLORIDE 100 ML/HR: 5; 450 INJECTION, SOLUTION INTRAVENOUS at 07:54

## 2024-08-05 RX ADMIN — PIPERACILLIN AND TAZOBACTAM 4.5 G: 4; .5 INJECTION, POWDER, FOR SOLUTION INTRAVENOUS at 07:51

## 2024-08-05 RX ADMIN — Medication 3 ML: at 08:21

## 2024-08-05 RX ADMIN — SODIUM PHOSPHATE, MONOBASIC, MONOHYDRATE AND SODIUM PHOSPHATE, DIBASIC, ANHYDROUS 15 MMOL: 142; 276 INJECTION, SOLUTION INTRAVENOUS at 04:53

## 2024-08-05 NOTE — PROGRESS NOTES
"    [facility] HOSPITALIST    PROGRESS NOTE    Name:  Alek Ray   Age:  48 y.o.  Sex:  male  :  1975  MRN:  [id number]   Visit Number:  [id number]  Admission Date:  8/3/2024  Date Of Service:  24  Primary Care Physician:  Provider, No Known     LOS: 1 day :    Chief Complaint:      Abdominal pain    Subjective:    Patient seen evaluated.  Tolerated soft diet yesterday evening.  Will advance further today.  Urinating, defecating, ambulating without any issue.  JERRY drain still draining serosanguineous fluid.    Hospital Course:    Patient is a 48-year-old male brought to the emergency department for complaints of right-sided abdominal pain.  It has been ongoing for several days.  Patient reports associated nausea and vomiting with the abdominal pain since onset.  Patient reports that he has taken Tylenol and Motrin without any improvement.  Reports feeling similar quality to \"gallbladder attack\" several years ago.  Abdominal pain gets worse with movement.     In the emergency department, significant laboratory data revealed creatinine 1.35, glucose 150, lactic acid 2.3, white count 11.8.  CT abdomen pelvis with contrast showed acute appendicitis with small amount of free fluid and extraluminal air consistent with ruptured.  General surgery, Dr. Vladez, was consulted from the ED.  Patient was taken to the OR for emergent appendectomy with drainage of abscess.  10 mm JERRY drain was placed in the right lower quadrant.  Patient tolerated the surgical intervention well without any immediate postoperative complications.  Hospitalist service was consulted for medical management of sepsis, chronic medical conditions, and transfer of care starting 2024.    Review of Systems:     All systems were reviewed and negative except as mentioned in subjective, assessment and plan.    Vital Signs:    Temp:  [98.3 °F (36.8 °C)-98.7 °F (37.1 °C)] 98.7 °F (37.1 °C)  Heart Rate:  [96] " 96  Resp:  [14-16] 14  BP: (134-143)/(85-87) 143/87    Intake and output:    I/O last 3 completed shifts:  In: 993.3 [I.V.:993.3]  Out: 2000 [Urine:1900; Drains:100]  No intake/output data recorded.    Physical Examination:    General Appearance:  Alert and cooperative.  No acute distress.   Head:  Atraumatic and normocephalic.   Eyes: Conjunctivae and sclerae normal, no icterus. No pallor.   Throat: No oral lesions, no thrush, oral mucosa moist.   Neck: Supple, trachea midline, no thyromegaly.   Lungs:   Breath sounds heard bilaterally equally.  No wheezing or crackles. No Pleural rub or bronchial breathing.   Heart:  Normal S1 and S2, no murmur, no gallop, no rub. No JVD.   Abdomen:   Normal bowel sounds in all quadrants, no masses, no organomegaly. Soft, improved tenderness localized to right lower quadrant, nondistended, no rebound tenderness.  No acute abdomen at this time.  Incision bandages clean/dry/intact.  JERRY bulb noted in the right lower quadrant    Extremities: Supple, no edema, no cyanosis, no clubbing.   Skin: No bleeding or rash.   Neurologic: Alert and oriented x 3. No facial asymmetry. Moves all four limbs. No tremors.      Laboratory results:    Results from last 7 days   Lab Units 08/05/24  0638 08/04/24  0615 08/03/24  1609 08/03/24  1014   SODIUM mmol/L 137 133* 131* 133*   POTASSIUM mmol/L 4.0 4.6 4.6 3.6   CHLORIDE mmol/L 101 100 97* 95*   CO2 mmol/L 24.7 22.4 20.9* 21.4*   BUN mg/dL 13 12 17 19   CREATININE mg/dL 0.80 0.74* 1.04 1.35*   CALCIUM mg/dL 8.3* 8.6 8.2* 8.9   BILIRUBIN mg/dL  --   --   --  1.6*   ALK PHOS U/L  --   --   --  95   ALT (SGPT) U/L  --   --   --  52*   AST (SGOT) U/L  --   --   --  38   GLUCOSE mg/dL 121* 174* 168* 150*     Results from last 7 days   Lab Units 08/05/24  0638 08/04/24  0615 08/03/24  1609   WBC 10*3/mm3 12.45* 13.05* 13.65*   HEMOGLOBIN g/dL 13.0 13.6 14.8   HEMATOCRIT % 37.5 38.5 42.6   PLATELETS 10*3/mm3 208 156 147             Results from last 7 days  "  Lab Units 08/03/24  1203 08/03/24  1202   BLOODCX  No growth at 24 hours Escherichia coli*     No results for input(s): \"PHART\", \"GEI7ABD\", \"PO2ART\", \"UAU7UXD\", \"BASEEXCESS\" in the last 8760 hours.   I have reviewed the patient's laboratory results.    Radiology results:    CT Abdomen Pelvis With Contrast    Result Date: 8/3/2024  PROCEDURE: CT ABDOMEN PELVIS W CONTRAST-  HISTORY: RUQ, R flnk pain, eval kidney stone, cholecystitis  COMPARISON: November 5, 2018..  PROCEDURE: The patient was injected with IV contrast. Axial images were obtained from the lung bases to the pubic symphysis by computed tomography. This study was performed with techniques to keep radiation doses as low as reasonably achievable, (ALARA). Individualized dose reduction techniques using automated exposure control or adjustment of mA and/or kV according to the patient size were employed.  FINDINGS:  ABDOMEN: The lung bases are clear. The heart is proper size. The liver demonstrates diffuse fatty infiltration and is enlarged up to 21 cm without focal mass. Gallbladder is present and contains at least one gallstone. No wall thickening identified. There is minimal fluid adjacent to the inferior tip of the right lobe of the liver extending into the right lower quadrant. The spleen is mildly enlarged without focal abnormality. No adrenal mass is present.  The pancreas is unremarkable. The kidneys are unremarkable, without evidence of mass or hydronephrosis. The aorta is proper caliber.     PELVIS: The GI tract demonstrates air-fluid levels in nondistended small bowel, consider ileus. The appendix is retrocecal and extends into the right upper quadrant adjacent to the inferior tip of the right lobe of the liver best seen series 3 image 48. Appendix is dilated up to 15 mm and contains an appendicolith proximally. It contains air but there is also extraluminal air anteriorly best seen series 2 image 47 consistent with rupture. There is infiltration " of the surrounding fat. Small mesenteric lymph nodes are seen medial to the inflammatory process. No free fluid identified in the pelvis. The urinary bladder is mostly collapsed. Prostate is normal in size. There is no free fluid, adenopathy, or inflammatory process.      Impression: Acute appendicitis of a retrocecal appendix containing an appendicalith and extending up to the medial aspect of the inferior right lobe of the liver with small amount of free fluid and extraluminal air consistent with rupture.  Dr. Harman notified in the ER at 11:10am August 3, 2024.  Cholelithiasis.  Enlarged, fatty infiltrated liver with mild splenomegaly; recommend correlation with liver function tests.  CTDI: 16.31 mGy DLP:1047.06 mGy.cm  This report was signed and finalized on 8/3/2024 11:13 AM by Dora Rivera MD.     I have reviewed the patient's radiology reports.    Medication Review:     I have reviewed the patient's active and prn medications.     Problem List:      Ruptured appendicitis      Assessment:    Severe sepsis secondary to ruptured appendicitis  Appendicitis  E Coli Bacteremia  Lactic acidosis  Acute renal insufficiency  Hyperglycemia    Plan:    Severe sepsis secondary to ruptured appendicitis  Appendicitis  E Coli bacteremia  Lactic acidosis  Patient meets sepsis criteria with leukocytosis, tachycardia.  Source of infection ruptured appendix.  Initial lactic acid greater than 2, acute renal insufficiency present.  IV Zosyn.  IV antibiotics.  POD#2 RA Appendectomy with abscess drainage.  Infection control has been reasonably achieved.  Pain control per general surgery.  Advancing diet  Right lower quadrant JERRY drain present.  Final sensitivities pending, consider changing to PO option if amenable.  Initial E. coli blood cultures likely contaminant as these were positive x 1, repeat culture pending.  Most appropriate course forward is following repeat cultures' sensitivities, if any grow out.  Acute renal  insufficiency  IV fluids.  Avoid nephrotoxic agents as appropriate.  If no improvement next 24 to 48 hours, consider nephrology consultation.  Hyperglycemia  A1c 5.2%    DVT Prophylaxis: SCDs  Code Status: Full code  Diet: Start cardiac today  Discharge Plan: Home tomorrow likely, pending blood cultures    Aniceto Nye DO  08/05/24  08:12 EDT    Dictated utilizing Dragon dictation.

## 2024-08-05 NOTE — TELEPHONE ENCOUNTER
NO PA REQ FOR LAP APPY WITH DR. OLGUIN ON 08/03/2024-VERIFIED BY PHONE WITH PT'S INSURANCE. S/W CRISTOPHER WEBER @ 8:58 AM 08/05/2024

## 2024-08-05 NOTE — CASE MANAGEMENT/SOCIAL WORK
Case Management Discharge Note           Provided Post Acute Provider List?: N/A  Provided Post Acute Provider Quality & Resource List?: N/A    Selected Continued Care - Admitted Since 8/3/2024       Destination    No services have been selected for the patient.                Durable Medical Equipment    No services have been selected for the patient.                Dialysis/Infusion    No services have been selected for the patient.                Home Medical Care    No services have been selected for the patient.                Therapy    No services have been selected for the patient.                Community Resources    No services have been selected for the patient.                Community & DME    No services have been selected for the patient.                    Transportation Services  Private: Car    Final Discharge Disposition Code: 01 - home or self-care

## 2024-08-05 NOTE — PLAN OF CARE
Goal Outcome Evaluation:  Plan of Care Reviewed With: patient        Progress: improving  Outcome Evaluation: VS stable throughout shift. JERRY drain seems to be draining well, no complaints of pain. Lap sites have no drainage, no complaints of pain. Patient took a shower causing lap sites to be wet, redressed with dry dressing. No significant events, continuing to monitor.

## 2024-08-05 NOTE — CASE MANAGEMENT/SOCIAL WORK
Case Management/Social Work    Patient Name:  Alek Ray  YOB: 1975  MRN: [id number]  Admit Date:  8/3/2024        08:42 EDT  Met with patient at bedside. He's planning to return home with his family once medically ready. Stated he had no needs at this time. CM will continue to follow.      Electronically signed by:  Cesar Mcgarry RN  08/05/24 08:42 EDT

## 2024-08-05 NOTE — DISCHARGE SUMMARY
"    [facility] HOSPITALIST   DISCHARGE SUMMARY      Name:  Alek Ray   Age:  48 y.o.  Sex:  male  :  1975  MRN:  [id number]   Visit Number:  [id number]    Admission Date:  8/3/2024  Date of Discharge:  2024  Primary Care Physician:  Provider, No Known    Important issues to note:    Complete 7 days of Augmentin.  Follow-up outpatient with general surgery to pull JERRY drain.    Discharge Diagnoses:     Severe sepsis secondary to ruptured appendicitis  Appendicitis  E Coli Bacteremia  Lactic acidosis  Acute renal insufficiency  Hyperglycemia    Problem List:     Active Hospital Problems    Diagnosis  POA    **Ruptured appendicitis [K35.32]  Yes      Resolved Hospital Problems   No resolved problems to display.     Presenting Problem:    Chief Complaint   Patient presents with    Flank Pain      Consults:     Consulting Physician(s)         Provider   Role Specialty     Aniceto Nye DO      Consulting Physician Hospitalist          Procedures Performed:    Procedure(s):  APPENDECTOMY LAPAROSCOPIC WITH [employer]OT    History of presenting illness/Hospital Course:    Patient is a 48-year-old male brought to the emergency department for complaints of right-sided abdominal pain.  It has been ongoing for several days.  Patient reports associated nausea and vomiting with the abdominal pain since onset.  Patient reports that he has taken Tylenol and Motrin without any improvement.  Reports feeling similar quality to \"gallbladder attack\" several years ago.  Abdominal pain gets worse with movement.     In the emergency department, significant laboratory data revealed creatinine 1.35, glucose 150, lactic acid 2.3, white count 11.8.  CT abdomen pelvis with contrast showed acute appendicitis with small amount of free fluid and extraluminal air consistent with ruptured.  General surgery, Dr. Valdez, was consulted from the ED.  Patient was taken to the OR for emergent appendectomy with " drainage of abscess.  10 mm JERRY drain was placed in the right lower quadrant.  Patient tolerated the surgical intervention well without any immediate postoperative complications.  Hospitalist service was consulted for medical management of sepsis, chronic medical conditions, and transfer of care starting August 4, 2024.    Patient has done well through his hospitalization, infection control was achieved in the OR, IV antibiotics were initiated while hospitalized.  Patient be transition to oral Augmentin, p.o. twice daily for 7 days.  Patient has a follow-up with general surgery outpatient to have JERRY drain pulled.  Plan of care was discussed with general surgery, Dr. Valdez, we are both mutually in agreement with the treatment plan.  Patient's questions concerns were addressed at bedside, he is in agreement with treatment plan.  Patient has reached maximum medical improvement from his inpatient hospitalization.  Patient be discharged in stable condition.    Vital Signs:    Temp:  [98.3 °F (36.8 °C)-98.7 °F (37.1 °C)] 98.6 °F (37 °C)  Heart Rate:  [96] 96  Resp:  [14-16] 16  BP: (131-143)/(85-87) 131/86    Physical Exam:    General Appearance:  Alert and cooperative.    Head:  Atraumatic and normocephalic.   Eyes: Conjunctivae and sclerae normal, no icterus. No pallor.   Ears:  Ears with no abnormalities noted.   Throat: No oral lesions, no thrush, oral mucosa moist.   Neck: Supple, trachea midline, no thyromegaly.   Back:   No kyphoscoliosis present. No tenderness to palpation.   Lungs:   Breath sounds heard bilaterally equally.  No crackles or wheezing. No Pleural rub or bronchial breathing.   Heart:  Normal S1 and S2, no murmur, no gallop, no rub. No JVD.   Abdomen:   Normal bowel sounds in all quadrants, no masses, no organomegaly. Soft, improved tenderness localized to right lower quadrant, nondistended, no rebound tenderness. No acute abdomen at this time. Incision bandages clean/dry/intact. JERRY bulb noted in the  right lower quadrant    Extremities: Supple, no edema, no cyanosis, no clubbing.   Pulses: Pulses palpable bilaterally.   Skin: No bleeding or rash.   Neurologic: Alert and oriented x 3. No facial asymmetry. Moves all four limbs. No tremors.     Pertinent Lab Results:     Results from last 7 days   Lab Units 08/05/24  0638 08/04/24  0615 08/03/24  1609 08/03/24  1014   SODIUM mmol/L 137 133* 131* 133*   POTASSIUM mmol/L 4.0 4.6 4.6 3.6   CHLORIDE mmol/L 101 100 97* 95*   CO2 mmol/L 24.7 22.4 20.9* 21.4*   BUN mg/dL 13 12 17 19   CREATININE mg/dL 0.80 0.74* 1.04 1.35*   CALCIUM mg/dL 8.3* 8.6 8.2* 8.9   BILIRUBIN mg/dL  --   --   --  1.6*   ALK PHOS U/L  --   --   --  95   ALT (SGPT) U/L  --   --   --  52*   AST (SGOT) U/L  --   --   --  38   GLUCOSE mg/dL 121* 174* 168* 150*     Results from last 7 days   Lab Units 08/05/24  0638 08/04/24  0615 08/03/24  1609   WBC 10*3/mm3 12.45* 13.05* 13.65*   HEMOGLOBIN g/dL 13.0 13.6 14.8   HEMATOCRIT % 37.5 38.5 42.6   PLATELETS 10*3/mm3 208 156 147                     Results from last 7 days   Lab Units 08/03/24  1014   LIPASE U/L 14         Results from last 7 days   Lab Units 08/04/24  0809 08/04/24  0800 08/03/24  1203 08/03/24  1202   BLOODCX  No growth at 24 hours No growth at 24 hours No growth at 24 hours Escherichia coli*       Pertinent Radiology Results:    Imaging Results (All)       Procedure Component Value Units Date/Time    CT Abdomen Pelvis With Contrast [[id number]] Collected: 08/03/24 1102     Updated: 08/03/24 1115    Narrative:      PROCEDURE: CT ABDOMEN PELVIS W CONTRAST-     HISTORY: RUQ, R flnk pain, eval kidney stone, cholecystitis     COMPARISON: November 5, 2018..     PROCEDURE: The patient was injected with IV contrast. Axial images were  obtained from the lung bases to the pubic symphysis by computed  tomography. This study was performed with techniques to keep radiation  doses as low as reasonably achievable, (ALARA). Individualized  dose  reduction techniques using automated exposure control or adjustment of  mA and/or kV according to the patient size were employed.     FINDINGS:     ABDOMEN: The lung bases are clear. The heart is proper size. The liver  demonstrates diffuse fatty infiltration and is enlarged up to 21 cm  without focal mass. Gallbladder is present and contains at least one  gallstone. No wall thickening identified. There is minimal fluid  adjacent to the inferior tip of the right lobe of the liver extending  into the right lower quadrant. The spleen is mildly enlarged without  focal abnormality. No adrenal mass is present.  The pancreas is  unremarkable. The kidneys are unremarkable, without evidence of mass or  hydronephrosis. The aorta is proper caliber.         PELVIS: The GI tract demonstrates air-fluid levels in nondistended small  bowel, consider ileus. The appendix is retrocecal and extends into the  right upper quadrant adjacent to the inferior tip of the right lobe of  the liver best seen series 3 image 48. Appendix is dilated up to 15 mm  and contains an appendicolith proximally. It contains air but there is  also extraluminal air anteriorly best seen series 2 image 47 consistent  with rupture. There is infiltration of the surrounding fat. Small  mesenteric lymph nodes are seen medial to the inflammatory process. No  free fluid identified in the pelvis. The urinary bladder is mostly  collapsed. Prostate is normal in size. There is no free fluid,  adenopathy, or inflammatory process.       Impression:      Acute appendicitis of a retrocecal appendix containing an  appendicalith and extending up to the medial aspect of the inferior  right lobe of the liver with small amount of free fluid and extraluminal  air consistent with rupture.  Dr. Harman notified in the ER at 11:10am  August 3, 2024.     Cholelithiasis.     Enlarged, fatty infiltrated liver with mild splenomegaly; recommend  correlation with liver function  tests.     CTDI: 16.31 mGy  DLP:1047.06 mGy.cm     This report was signed and finalized on 8/3/2024 11:13 AM by Dora Rivera MD.                 Condition on Discharge:      Stable.    Code status during the hospital stay:    Code Status and Medical Interventions: CPR (Attempt to Resuscitate); Full Support   Ordered at: 08/03/24 1550     Level Of Support Discussed With:    Patient     Code Status (Patient has no pulse and is not breathing):    CPR (Attempt to Resuscitate)     Medical Interventions (Patient has pulse or is breathing):    Full Support     Discharge Disposition:    Home or Self Care    Discharge Medications:       Discharge Medications        New Medications        Instructions Start Date   amoxicillin-clavulanate 875-125 MG per tablet  Commonly known as: AUGMENTIN   1 tablet, Oral, 2 Times Daily             Stop These Medications      pantoprazole 40 MG EC tablet  Commonly known as: PROTONIX     sodium-potassium-magnesium sulfates 17.5-3.13-1.6 GM/177ML solution oral solution  Commonly known as: Suprep Bowel Prep Kit            Discharge Diet: Advance as tolerated      Activity at Discharge: As tolerated      Follow-up Appointments: Follow-up with general surgery in office in 3 days.     Follow-up Information       Provider, No Known .    Contact information:  [facility] [address] [phone]                           No future appointments.  Test Results Pending at Discharge:    Pending Labs       Order Current Status    Anaerobic Culture - Surgical Site, Abdominal Wall In process    TISSUE EXAM, [facility] (PAM,COR,MAD) In process    Blood Culture - Blood, Arm, Right Preliminary result    Blood Culture - Blood, Arm, Right Preliminary result    Blood Culture - Blood, Hand, Left Preliminary result    Blood Culture - Blood, Hand, Left Preliminary result    Body Fluid Culture - Surgical Site, Abdominal Wall Preliminary result               Aniceto Nye DO  08/05/24  11:55  EDT    Time: I spent 35 minutes on this discharge activity which included: face-to-face encounter with the patient, reviewing the data in the system, coordination of the care with the nursing staff as well as consultants, documentation, and entering orders.     Dictated utilizing Dragon dictation.

## 2024-08-05 NOTE — PROGRESS NOTES
LOS: 1 day   Patient Care Team:  Paul Guerrero MD as PCP - General (Family Medicine)      Chief Complaint: Patient states he feels wonderful      Interval History:     Patient Complaints: See Above, passing flatus, passing stool    History taken from: patient family RN    Vital Signs  Temp:  [98.3 °F (36.8 °C)-98.7 °F (37.1 °C)] 98.6 °F (37 °C)  Heart Rate:  [96] 96  Resp:  [14-16] 16  BP: (131-143)/(85-87) 131/86    Physical Exam:     General Appearance:    Alert, cooperative, in no acute distress   Head:    Normocephalic, without obvious abnormality, atraumatic   Lungs:     Clear to auscultation,respirations regular, even and                  unlabored    Heart:    Regular rhythm and normal rate, normal S1 and S2, no            murmur, no gallop, no rub, no click   Abdomen:     Normal bowel sounds, no masses, no organomegaly, soft        non-tender, non-distended, no guarding, no rebound                tenderness   Extremities:   Moves all extremities well, no edema, no cyanosis, no             redness   Pulses:   Pulses palpable and equal bilaterally   Skin:   No bleeding, bruising or rash        Results Review:       Lab Results (last 24 hours)       Procedure Component Value Units Date/Time    Blood Culture - Blood, Hand, Left [[id number]]  (Normal) Collected: 08/03/24 1203    Specimen: Blood from Hand, Left Updated: 08/05/24 1215     Blood Culture No growth at 2 days    Blood Culture - Blood, Hand, Left [[id number]]  (Normal) Collected: 08/04/24 0800    Specimen: Blood from Hand, Left Updated: 08/05/24 0830     Blood Culture No growth at 24 hours    Blood Culture - Blood, Arm, Right [[id number]]  (Normal) Collected: 08/04/24 0809    Specimen: Blood from Arm, Right Updated: 08/05/24 0830     Blood Culture No growth at 24 hours    Body Fluid Culture - Surgical Site, Abdominal Wall [[id number]]  (Abnormal)  (Susceptibility) Collected: 08/03/24 1432    Specimen: Surgical Site from Abdominal Wall Updated:  08/05/24 0819     Body Fluid Culture Moderate growth (3+) Escherichia coli     Gram Stain Moderate (3+) WBCs seen      Moderate (3+) Gram negative bacilli      Rare (1+) Gram negative coccobacilli    Narrative:      Organism under investigation.      Susceptibility        Escherichia coli      RASHARD      Amoxicillin + Clavulanate Susceptible      Ampicillin Susceptible      Ampicillin + Sulbactam Susceptible      Cefepime Susceptible      Ceftazidime Susceptible      Ceftriaxone Susceptible      Gentamicin Susceptible      Levofloxacin Susceptible      Piperacillin + Tazobactam Susceptible      Trimethoprim + Sulfamethoxazole Susceptible                       Susceptibility Comments       Escherichia coli    Cefazolin sensitivity will not be reported for Enterobacteriaceae in non-urine isolates. If cefazolin is preferred, please call the microbiology lab to request an E-test.  With the exception of urinary-sourced infections, aminoglycosides should not be used as monotherapy.               TISSUE EXAM, [facility] (PAM,COR,[facility]AD) [[id number]] Collected: 08/03/24 1431    Specimen: Tissue from Large Intestine, Appendix Updated: 08/05/24 0742    Phosphorus [[id number]]  (Normal) Collected: 08/05/24 0638    Specimen: Blood Updated: 08/05/24 0731     Phosphorus 2.8 mg/dL     Basic Metabolic Panel [[id number]]  (Abnormal) Collected: 08/05/24 0638    Specimen: Blood Updated: 08/05/24 0729     Glucose 121 mg/dL      BUN 13 mg/dL      Creatinine 0.80 mg/dL      Sodium 137 mmol/L      Potassium 4.0 mmol/L      Chloride 101 mmol/L      CO2 24.7 mmol/L      Calcium 8.3 mg/dL      BUN/Creatinine Ratio 16.3     Anion Gap 11.3 mmol/L      eGFR 110.5 mL/min/1.73     Narrative:      GFR Normal >60  Chronic Kidney Disease <60  Kidney Failure <15      Magnesium [[id number]]  (Normal) Collected: 08/05/24 0638    Specimen: Blood Updated: 08/05/24 0729     Magnesium 2.0 mg/dL     CBC & Differential [[id number]]  (Abnormal) Collected: 08/05/24 0638     Specimen: Blood Updated: 08/05/24 0715    Narrative:      The following orders were created for panel order CBC & Differential.  Procedure                               Abnormality         Status                     ---------                               -----------         ------                     CBC Auto Differential[[id number]]        Abnormal            Final result                 Please view results for these tests on the individual orders.    CBC Auto Differential [[id number]]  (Abnormal) Collected: 08/05/24 0638    Specimen: Blood Updated: 08/05/24 0715     WBC 12.45 10*3/mm3      RBC 4.39 10*6/mm3      Hemoglobin 13.0 g/dL      Hematocrit 37.5 %      MCV 85.4 fL      MCH 29.6 pg      MCHC 34.7 g/dL      RDW 13.0 %      RDW-SD 40.8 fl      MPV 10.4 fL      Platelets 208 10*3/mm3      Neutrophil % 82.0 %      Lymphocyte % 11.2 %      Monocyte % 6.2 %      Eosinophil % 0.0 %      Basophil % 0.2 %      Immature Grans % 0.4 %      Neutrophils, Absolute 10.21 10*3/mm3      Lymphocytes, Absolute 1.40 10*3/mm3      Monocytes, Absolute 0.77 10*3/mm3      Eosinophils, Absolute 0.00 10*3/mm3      Basophils, Absolute 0.02 10*3/mm3      Immature Grans, Absolute 0.05 10*3/mm3      nRBC 0.0 /100 WBC     Blood Culture - Blood, Arm, Right [[id number]]  (Abnormal) Collected: 08/03/24 1202    Specimen: Blood from Arm, Right Updated: 08/05/24 0631     Blood Culture Escherichia coli     Isolated from Anaerobic Bottle     Gram Stain Anaerobic Bottle Gram negative bacilli    Narrative:      Less than seven (7) mL's of blood was collected.  Insufficient quantity may yield false negative results.    Phosphorus [[id number]]  (Abnormal) Collected: 08/04/24 2056    Specimen: Blood Updated: 08/04/24 2117     Phosphorus 2.1 mg/dL                 Assessment & Plan       Ruptured appendicitis      Stable white male has done well 2 days status post robotic laparoscopic appendectomy for ruptured appendicitis with abscess drainage.  I did  review his culture results and discussed the situation with the hospitalist service, I have had a detailed discussion with the patient and the wife today in the hospital.  I think he can go home today, we should send him home on some oral Augmentin, I will fill out some pain medicine for prescription, I want to see him back in the office this coming Thursday and he needs instructions with regards to drain care.  He can shower with the drain in place.      Zeeshan Valdez MD  08/05/24  12:36 EDT

## 2024-08-06 LAB
BACTERIA SPEC AEROBE CULT: ABNORMAL
BACTERIA SPEC ANAEROBE CULT: ABNORMAL
GRAM STN SPEC: ABNORMAL
ISOLATED FROM: ABNORMAL
REF LAB TEST METHOD: NORMAL

## 2024-08-07 LAB
BACTERIA FLD CULT: ABNORMAL
GRAM STN SPEC: ABNORMAL

## 2024-08-08 ENCOUNTER — OFFICE VISIT (OUTPATIENT)
Dept: SURGERY | Facility: CLINIC | Age: 49
End: 2024-08-08
Payer: COMMERCIAL

## 2024-08-08 VITALS
SYSTOLIC BLOOD PRESSURE: 120 MMHG | WEIGHT: 267 LBS | TEMPERATURE: 97.8 F | RESPIRATION RATE: 16 BRPM | DIASTOLIC BLOOD PRESSURE: 78 MMHG | OXYGEN SATURATION: 94 % | BODY MASS INDEX: 36.16 KG/M2 | HEART RATE: 104 BPM | HEIGHT: 72 IN

## 2024-08-08 DIAGNOSIS — Z98.890 POST-OPERATIVE STATE: Primary | ICD-10-CM

## 2024-08-08 LAB — BACTERIA SPEC AEROBE CULT: NORMAL

## 2024-08-08 PROCEDURE — 99024 POSTOP FOLLOW-UP VISIT: CPT | Performed by: SURGERY

## 2024-08-08 NOTE — PROGRESS NOTES
"Patient: Alek Ray    YOB: 1975    Date: 08/08/2024    Primary Care Provider: Paul Guerrero MD    Chief Complaint   Patient presents with    Post-op     laparoscopic appendectomy        History of present illness:  I saw the patient in the office today as a followup from their recent laparoscopic appendectomy with JERRY drain placement which was performed 08/03/2024.  Pathology report showed acute suppurative appendicitis and acute serositis.  They state that they have done well and are having no complaints.    Vital Signs:   Vitals:    08/08/24 1506   BP: 120/78   Pulse: 104   Resp: 16   Temp: 97.8 °F (36.6 °C)   TempSrc: Temporal   SpO2: 94%   Weight: 121 kg (267 lb)   Height: 182.9 cm (72\")       Physical Exam:     General : no acute distress  Chest : clear bilaterally  COR : regular rate and rhythm  ABD :  soft nontender, all incisions healed nicely, JERRY drain had serous drainage only and was removed at the bedside today.    Assessment / Plan:    1. Post-operative state        I did discuss the situation with the patient today in the office and they have done well from their recent laparoscopic appendectomy.  I have released the patient back to normal activity, they understand that they need to be careful about heavy lifting.  I need to see the patient back in the office only if they are having further problems, they know to call me if they are.    I did have a detailed discussion with the patient and his wife in the office today, operative findings were explained to them, need for drain removal was explained to them, I need to see them back in the office only if he has further problems.      Electronically signed by Zeeshan Valdez MD  08/13/24                  "

## 2024-08-09 LAB
BACTERIA SPEC AEROBE CULT: NORMAL
BACTERIA SPEC AEROBE CULT: NORMAL

## (undated) DEVICE — RICH GENERAL LAPAROSCOPY: Brand: MEDLINE INDUSTRIES, INC.

## (undated) DEVICE — CVR DISP HUG U VAC STEEP TREND

## (undated) DEVICE — MONOPOLAR CURVED SCISSORS: Brand: ENDOWRIST

## (undated) DEVICE — COLUMN DRAPE

## (undated) DEVICE — KT ORCA VLV SXN AIR/H2O W/SEAL 1P/U STRL

## (undated) DEVICE — ANTIBACTERIAL UNDYED BRAIDED (POLYGLACTIN 910), SYNTHETIC ABSORBABLE SUTURE: Brand: COATED VICRYL

## (undated) DEVICE — GLV SURG SENSICARE W/ALOE PF LF 8.5 STRL

## (undated) DEVICE — DRN WND HUBLSS FLUT FULL PERF SIL10MM

## (undated) DEVICE — TOTAL TRAY, 16FR 10ML SIL FOLEY, URN: Brand: MEDLINE

## (undated) DEVICE — GOWN SURG ORBIS 3LVL XL STRL

## (undated) DEVICE — SUT VIC 0/0 UR6 27IN DYED J603H

## (undated) DEVICE — Device

## (undated) DEVICE — REDUCER: Brand: ENDOWRIST

## (undated) DEVICE — TIP COVER ACCESSORY

## (undated) DEVICE — ANCHOR TISSUE RETRIEVAL SYSTEM, BAG SIZE 125 ML, PORT SIZE 8 MM: Brand: ANCHOR TISSUE RETRIEVAL SYSTEM

## (undated) DEVICE — STAPLER 60: Brand: SUREFORM

## (undated) DEVICE — BLADELESS OBTURATOR: Brand: WECK VISTA

## (undated) DEVICE — CANNULA SEAL

## (undated) DEVICE — FENESTRATED BIPOLAR FORCEPS: Brand: ENDOWRIST

## (undated) DEVICE — RESERVOIR,SUCTION,100CC,SILICONE: Brand: MEDLINE

## (undated) DEVICE — ST TBG PNEUMOCLEAR EVAC SMOKE HIFLO

## (undated) DEVICE — SUREFIT, DUAL DISPERSIVE ELECTRODE, CONTACT QUALITY MONITOR: Brand: SUREFIT

## (undated) DEVICE — SUCTION IRRIGATOR: Brand: ENDOWRIST

## (undated) DEVICE — CLNSR INST PREKLENZ SOAK/SHLD 6ML MD

## (undated) DEVICE — SEAL

## (undated) DEVICE — SYS CLS PORTSITE CT CLOSESURE 5AND10/12

## (undated) DEVICE — ENDOGATOR AUXILIARY WATER JET CONNECTOR: Brand: ENDOGATOR

## (undated) DEVICE — BAND BAG 36" X 36": Brand: STERIMED

## (undated) DEVICE — FRCP BIOP COLD ENDOJAW ALLGTR W/NDL 2.8X2300MM BLU

## (undated) DEVICE — ARM DRAPE

## (undated) DEVICE — CONMED SCOPE SAVER BITE BLOCK, 20X27 MM: Brand: SCOPE SAVER

## (undated) DEVICE — ADHS LIQ MASTISOL 2/3ML

## (undated) DEVICE — 2000CC GUARDIAN II: Brand: GUARDIAN

## (undated) DEVICE — ENDOPATH PNEUMONEEDLE INSUFFLATION NEEDLES WITH LUER LOCK CONNECTORS 120MM: Brand: ENDOPATH